# Patient Record
Sex: FEMALE | Race: WHITE | Employment: UNEMPLOYED | URBAN - NONMETROPOLITAN AREA
[De-identification: names, ages, dates, MRNs, and addresses within clinical notes are randomized per-mention and may not be internally consistent; named-entity substitution may affect disease eponyms.]

---

## 2021-11-18 ENCOUNTER — HOSPITAL ENCOUNTER (OUTPATIENT)
Dept: CT IMAGING | Age: 56
Discharge: HOME | End: 2021-11-18
Payer: MEDICARE

## 2021-11-18 ENCOUNTER — HOSPITAL ENCOUNTER (OUTPATIENT)
Dept: CT IMAGING | Age: 56
Discharge: HOME | End: 2021-11-18

## 2021-11-18 DIAGNOSIS — R41.0 SUBACUTE DELIRIUM: ICD-10-CM

## 2021-11-18 DIAGNOSIS — R41.0 DELIRIUM: ICD-10-CM

## 2021-11-18 PROCEDURE — 6360000004 HC RX CONTRAST MEDICATION: Performed by: NURSE PRACTITIONER

## 2021-11-18 PROCEDURE — 70470 CT HEAD/BRAIN W/O & W/DYE: CPT

## 2021-11-19 RX ADMIN — IOPAMIDOL 80 ML: 755 INJECTION, SOLUTION INTRAVENOUS at 12:39

## 2021-12-04 ENCOUNTER — HOSPITAL ENCOUNTER (EMERGENCY)
Age: 56
Discharge: HOME | End: 2021-12-04
Attending: EMERGENCY MEDICINE
Payer: MEDICARE

## 2021-12-04 VITALS
TEMPERATURE: 97.9 F | WEIGHT: 135 LBS | HEART RATE: 85 BPM | SYSTOLIC BLOOD PRESSURE: 107 MMHG | OXYGEN SATURATION: 100 % | DIASTOLIC BLOOD PRESSURE: 90 MMHG | RESPIRATION RATE: 19 BRPM

## 2021-12-04 DIAGNOSIS — F40.01 PANIC DISORDER WITH AGORAPHOBIA: Primary | ICD-10-CM

## 2021-12-04 DIAGNOSIS — T40.725A ADVERSE EFFECT OF SYNTHETIC CANNABINOID, INITIAL ENCOUNTER: ICD-10-CM

## 2021-12-04 DIAGNOSIS — Z77.29 CARBON MONOXIDE EXPOSURE: ICD-10-CM

## 2021-12-04 LAB
AMPHETAMINE+METHAMPHETAMINE URINE SCREEN: NEGATIVE
ANION GAP SERPL CALCULATED.3IONS-SCNC: 14 MEQ/L (ref 8–16)
BACTERIA: NORMAL
BARBITURATE QUANTITATIVE URINE: NEGATIVE
BASOPHILS # BLD: 0.2 %
BASOPHILS ABSOLUTE: 0 THOU/MM3 (ref 0–0.1)
BENZODIAZEPINE QUANTITATIVE URINE: NEGATIVE
BILIRUBIN URINE: NEGATIVE
BLOOD, URINE: NEGATIVE
BUN BLDV-MCNC: 11 MG/DL (ref 7–22)
CALCIUM SERPL-MCNC: 9.7 MG/DL (ref 8.5–10.5)
CANNABINOID QUANTITATIVE URINE: POSITIVE
CARBON MONOXIDE, BLOOD: 14.6 % CO SAT
CARBON MONOXIDE, BLOOD: 6 % CO SAT
CASTS: NORMAL /LPF
CASTS: NORMAL /LPF
CHARACTER, URINE: CLEAR
CHLORIDE BLD-SCNC: 105 MEQ/L (ref 98–111)
CO2: 26 MEQ/L (ref 23–33)
COCAINE METABOLITE QUANTITATIVE URINE: NEGATIVE
COLOR: YELLOW
CREAT SERPL-MCNC: 0.7 MG/DL (ref 0.4–1.2)
CRYSTALS: NORMAL
EOSINOPHIL # BLD: 0.1 %
EOSINOPHILS ABSOLUTE: 0 THOU/MM3 (ref 0–0.4)
EPITHELIAL CELLS, UA: NORMAL /HPF
ERYTHROCYTE [DISTWIDTH] IN BLOOD BY AUTOMATED COUNT: 13 % (ref 11.5–14.5)
ERYTHROCYTE [DISTWIDTH] IN BLOOD BY AUTOMATED COUNT: 46.8 FL (ref 35–45)
GFR SERPL CREATININE-BSD FRML MDRD: 87 ML/MIN/1.73M2
GLUCOSE BLD-MCNC: 166 MG/DL (ref 70–108)
GLUCOSE, URINE: NEGATIVE MG/DL
HCT VFR BLD CALC: 46.8 % (ref 37–47)
HEMOGLOBIN: 15.3 GM/DL (ref 12–16)
IMMATURE GRANS (ABS): 0.04 THOU/MM3 (ref 0–0.07)
IMMATURE GRANULOCYTES: 0.4 %
KETONES, URINE: NEGATIVE
LEUKOCYTE ESTERASE, URINE: NEGATIVE
LYMPHOCYTES # BLD: 9.2 %
LYMPHOCYTES ABSOLUTE: 0.8 THOU/MM3 (ref 1–4.8)
MCH RBC QN AUTO: 32 PG (ref 26–33)
MCHC RBC AUTO-ENTMCNC: 32.7 GM/DL (ref 32.2–35.5)
MCV RBC AUTO: 97.9 FL (ref 81–99)
MISCELLANEOUS LAB TEST RESULT: NORMAL
MONOCYTES # BLD: 1.8 %
MONOCYTES ABSOLUTE: 0.2 THOU/MM3 (ref 0.4–1.3)
NITRITE, URINE: NEGATIVE
NUCLEATED RED BLOOD CELLS: 0 /100 WBC
OPIATES, URINE: NEGATIVE
OSMOLALITY CALCULATION: 291.9 MOSMOL/KG (ref 275–300)
OXYCODONE: NEGATIVE
PH UA: 7 (ref 5–9)
PHENCYCLIDINE QUANTITATIVE URINE: NEGATIVE
PLATELET # BLD: 230 THOU/MM3 (ref 130–400)
PMV BLD AUTO: 10.9 FL (ref 9.4–12.4)
POTASSIUM SERPL-SCNC: 4.3 MEQ/L (ref 3.5–5.2)
PROTEIN UA: NEGATIVE MG/DL
RBC # BLD: 4.78 MILL/MM3 (ref 4.2–5.4)
RBC URINE: NORMAL /HPF
RENAL EPITHELIAL, UA: NORMAL
SEG NEUTROPHILS: 88.3 %
SEGMENTED NEUTROPHILS ABSOLUTE COUNT: 7.9 THOU/MM3 (ref 1.8–7.7)
SODIUM BLD-SCNC: 145 MEQ/L (ref 135–145)
SPECIFIC GRAVITY UA: 1.01 (ref 1–1.03)
UROBILINOGEN, URINE: 0.2 EU/DL (ref 0–1)
WBC # BLD: 9 THOU/MM3 (ref 4.8–10.8)
WBC UA: NORMAL /HPF
YEAST: NORMAL

## 2021-12-04 PROCEDURE — 80307 DRUG TEST PRSMV CHEM ANLYZR: CPT

## 2021-12-04 PROCEDURE — 81001 URINALYSIS AUTO W/SCOPE: CPT

## 2021-12-04 PROCEDURE — 85025 COMPLETE CBC W/AUTO DIFF WBC: CPT

## 2021-12-04 PROCEDURE — 80048 BASIC METABOLIC PNL TOTAL CA: CPT

## 2021-12-04 PROCEDURE — 82375 ASSAY CARBOXYHB QUANT: CPT

## 2021-12-04 PROCEDURE — 6370000000 HC RX 637 (ALT 250 FOR IP): Performed by: STUDENT IN AN ORGANIZED HEALTH CARE EDUCATION/TRAINING PROGRAM

## 2021-12-04 PROCEDURE — 99284 EMERGENCY DEPT VISIT MOD MDM: CPT

## 2021-12-04 PROCEDURE — 36415 COLL VENOUS BLD VENIPUNCTURE: CPT

## 2021-12-04 RX ORDER — LORAZEPAM 1 MG/1
1 TABLET ORAL ONCE
Status: COMPLETED | OUTPATIENT
Start: 2021-12-04 | End: 2021-12-04

## 2021-12-04 RX ADMIN — LORAZEPAM 1 MG: 1 TABLET ORAL at 12:22

## 2021-12-04 ASSESSMENT — ENCOUNTER SYMPTOMS
EYES NEGATIVE: 1
ALLERGIC/IMMUNOLOGIC NEGATIVE: 1
RESPIRATORY NEGATIVE: 1
GASTROINTESTINAL NEGATIVE: 1

## 2021-12-04 NOTE — ED PROVIDER NOTES
[address] [address]          Pt Name: Michael Amezcua  MRN: [id number]  Birthdate 1965  Date of evaluation: 12/4/2021  Treating Resident Physician: Africa Burgos MD  Supervising Physician: Dr. Kenji Mcdonough       Chief Complaint   Patient presents with    Altered Mental Status     History obtained from the patient. HISTORY OF PRESENT ILLNESS    HPI  Michael Amezcua is a 49 y.o. female who presents to the emergency department for evaluation of 2 months history of inability to participate in activities of daily living due to overwhelming sense of fear, sadness, nervousness, and paranoia. The patient's son was also in the examination room available for history. The patient states that she feels that she is having acute changes to her body however the son states that he has not noticed any changes in her body. The patient states that she was seen in [place] for similar complaints and that imaging was performed at that time which per the patient showed no acute abnormalities. Per the patient she is stopped taking some of her theatric medications like Seroquel over the last few weeks. She states that she has been unable to schedule appointment with psychiatrist due to availability. Patient denies any suicidal or homicidal ideation. Patient states that she does not have access to firearms at home. Patient denies auditory or visual hallucinations  The patient has no other acute complaints at this time. REVIEW OF SYSTEMS   Review of Systems   Constitutional: Positive for activity change. HENT: Negative. Eyes: Negative. Respiratory: Negative. Cardiovascular: Negative. Gastrointestinal: Negative. Endocrine: Negative. Genitourinary: Negative. Musculoskeletal: Negative. Skin: Negative. Allergic/Immunologic: Negative. Neurological: Negative for seizures, weakness, light-headedness and numbness. supple. Skin:     General: Skin is warm and dry. Capillary Refill: Capillary refill takes less than 2 seconds. Neurological:      Mental Status: She is alert. GCS: GCS eye subscore is 4. GCS verbal subscore is 5. GCS motor subscore is 6. Cranial Nerves: No cranial nerve deficit, dysarthria or facial asymmetry. Sensory: No sensory deficit. Motor: No weakness. Coordination: Coordination normal.   Psychiatric:         Attention and Perception: She does not perceive auditory or visual hallucinations. Mood and Affect: Mood is anxious and depressed. Speech: Speech is tangential.         Thought Content: Thought content is paranoid. Thought content does not include homicidal or suicidal ideation. Thought content does not include homicidal or suicidal plan. MEDICAL DECISION MAKING   Initial Assessment:   1. Panic disorder with agoraphobia  2. Carbon monoxide exposure  3. Adverse effects of cannabinoids. Plan:    No suicidal or homicidal ideation, no access to firearms in the home   Patient is safe from psychiatric standpoint   Elevated carboxyhemoglobin elicited on labs. These levels are inconsistent with smoking elation from chronic smoking though the patient does endorse being a smoker.  Oxyhemoglobin levels did decrease with oxygen supplementation with associated improvement to mentation.  Will instruct patient to contact fire department for home evaluation for carbon monoxide source   Return instructions provided. Also instructed to follow-up with behavioral health specialist via appointment made during her prior psychiatric inpatient admission at [facility].         ED RESULTS   Laboratory results:  Labs Reviewed   CBC WITH AUTO DIFFERENTIAL - Abnormal; Notable for the following components:       Result Value    RDW-SD 46.8 (*)     Segs Absolute 7.9 (*)     Lymphocytes Absolute 0.8 (*)     Monocytes Absolute 0.2 (*)     All other components within normal limits   BASIC METABOLIC PANEL - Abnormal; Notable for the following components:    Glucose 166 (*)     All other components within normal limits   GLOMERULAR FILTRATION RATE, ESTIMATED - Abnormal; Notable for the following components:    Est, Glom Filt Rate 87 (*)     All other components within normal limits   URINALYSIS WITH MICROSCOPIC   URINE DRUG SCREEN   CARBOXYHEMOGLOBIN   ANION GAP   OSMOLALITY   CARBOXYHEMOGLOBIN       Radiologic studies results:  No orders to display       ED Medications administered this visit:   Medications   LORazepam (ATIVAN) tablet 1 mg (1 mg Oral Given 21 1222)         ED COURSE     ED Course as of 21   Sat Dec 04, 2021   1138 [facility] AN AFFILIATE OF [facility] counselor notified of patient to provide outpatient psychiatry resources. [DT]   1228 Carbon Monoxide, Blood: 14.6 [DT]   1322 Outside facility records obtained showing inpatient admission to psychiatric unit from  and discharged on . It appears that the treating physician has switched her Seroquel to olanzapine during this admission. She was discharged with a follow-up appointment with behavioral health on  [JT]   1407 Carbon Monoxide, Blood: 6.0  Carboxyhemoglobin did decrease from 14 to 6 with oxygen supplementation via nonrebreather mask [JT]   1450 Patient underwent 2 hours of supplemental oxygen mwith nonrebreather mask. Upon reevaluation of the patient, she is appearing much more coherent and alert. Her family in the room with her are also stating that this is the most coherent she has been the last month and a half or so. [JT]      ED Course User Index  [DT] Emma Fernandez MD  [JT] Leward Apgar, MD       Strict return precautions and follow up instructions were discussed with the patient prior to discharge, with which the patient agrees. MEDICATION CHANGES     There are no discharge medications for this patient.         FINAL DISPOSITION     Final diagnoses:   Panic disorder with agoraphobia   Carbon monoxide exposure   Adverse effect of synthetic cannabinoid, initial encounter     Condition: condition: stable  Dispo: Discharge to home      This transcription was electronically signed. Parts of this transcriptions may have been dictated by use of voice recognition software and electronically transcribed, and parts may have been transcribed with the assistance of an ED scribe. The transcription may contain errors not detected in proofreading. Please refer to my supervising physician's documentation if my documentation differs.     Electronically Signed: Mirian Zelaya MD, 12/04/21, 7:48 PM          Mirian Zelaya MD  Resident  12/04/21 2389

## 2021-12-04 NOTE — ED PROVIDER NOTES
I have reviewed and interpreted all available lab, radiology and ekg results available at the moment. Diagnosis, treatment and disposition plans were discussed and agreed upon by patient. This transcription was electronically signed. It was dictated by use of voice recognition software and electronically transcribed. The transcription may contain errors not detected in proofreading.      I performed direct supervision and was present for the critical portion following procedures: None  Critical care time on this case: None    Electronically signed by Lucia Clarke MD on 21 at 12:29 PM EST       Lucia Clarke MD  [id number]

## 2021-12-04 NOTE — ED NOTES
Patient presents to the ED with son and daughter in law for concerns of continued confusion/dementia concerns on going for the last few months. Patient is alert and oriented, answering questions appropriately, however, seems confused. Patient states \"I am nervous and scared all the time. I am on disability and probably about to lose everything\" family at bedside state patient was admitted to a psych floor at [facility]. Patient states she was released on December 2nd. Son at bedside state they brought patient here due to not knowing what other resources or plan. Patient states she normally lives on her own, but has been living with a sister \"due to not being able to care for myself\". Patient also reports she has not been taking prescribed medications due to concerns of not being able to sleep and constipation. Exposed to rat poisoning about 2-3 months ago. Patient states she feels like symptoms started after exposure.         Luis Tatum RN  12/04/21 0183

## 2021-12-14 ENCOUNTER — HOSPITAL ENCOUNTER (INPATIENT)
Age: 56
LOS: 2 days | Discharge: PSYCHIATRIC OR SUBSTANCE USE FACILITY | DRG: 880 | End: 2021-12-16
Attending: PEDIATRICS | Admitting: PEDIATRICS
Payer: MEDICARE

## 2021-12-14 ENCOUNTER — APPOINTMENT (OUTPATIENT)
Dept: GENERAL RADIOLOGY | Age: 56
DRG: 880 | End: 2021-12-14
Payer: MEDICARE

## 2021-12-14 DIAGNOSIS — R41.82 ALTERED MENTAL STATUS, UNSPECIFIED ALTERED MENTAL STATUS TYPE: Primary | ICD-10-CM

## 2021-12-14 LAB
ACETAMINOPHEN LEVEL: < 5 UG/ML (ref 0–20)
ALBUMIN SERPL-MCNC: 4.8 G/DL (ref 3.5–5.1)
ALP BLD-CCNC: 105 U/L (ref 38–126)
ALT SERPL-CCNC: 25 U/L (ref 11–66)
AMPHETAMINE+METHAMPHETAMINE URINE SCREEN: NEGATIVE
ANION GAP SERPL CALCULATED.3IONS-SCNC: 13 MEQ/L (ref 8–16)
AST SERPL-CCNC: 21 U/L (ref 5–40)
BARBITURATE QUANTITATIVE URINE: NEGATIVE
BASOPHILS # BLD: 0.3 %
BASOPHILS ABSOLUTE: 0 THOU/MM3 (ref 0–0.1)
BENZODIAZEPINE QUANTITATIVE URINE: NEGATIVE
BILIRUB SERPL-MCNC: 0.6 MG/DL (ref 0.3–1.2)
BUN BLDV-MCNC: 9 MG/DL (ref 7–22)
CALCIUM SERPL-MCNC: 9.9 MG/DL (ref 8.5–10.5)
CANNABINOID QUANTITATIVE URINE: POSITIVE
CHLORIDE BLD-SCNC: 102 MEQ/L (ref 98–111)
CO2: 27 MEQ/L (ref 23–33)
COCAINE METABOLITE QUANTITATIVE URINE: NEGATIVE
CREAT SERPL-MCNC: 0.7 MG/DL (ref 0.4–1.2)
EOSINOPHIL # BLD: 0.5 %
EOSINOPHILS ABSOLUTE: 0 THOU/MM3 (ref 0–0.4)
ERYTHROCYTE [DISTWIDTH] IN BLOOD BY AUTOMATED COUNT: 12.8 % (ref 11.5–14.5)
ERYTHROCYTE [DISTWIDTH] IN BLOOD BY AUTOMATED COUNT: 44.3 FL (ref 35–45)
ETHYL ALCOHOL, SERUM: < 0.01 %
GFR SERPL CREATININE-BSD FRML MDRD: 87 ML/MIN/1.73M2
GLUCOSE BLD-MCNC: 133 MG/DL (ref 70–108)
HCT VFR BLD CALC: 43.3 % (ref 37–47)
HEMOGLOBIN: 14.8 GM/DL (ref 12–16)
IMMATURE GRANS (ABS): 0.02 THOU/MM3 (ref 0–0.07)
IMMATURE GRANULOCYTES: 0.2 %
LYMPHOCYTES # BLD: 28.8 %
LYMPHOCYTES ABSOLUTE: 2.6 THOU/MM3 (ref 1–4.8)
MCH RBC QN AUTO: 32.5 PG (ref 26–33)
MCHC RBC AUTO-ENTMCNC: 34.2 GM/DL (ref 32.2–35.5)
MCV RBC AUTO: 95 FL (ref 81–99)
MONOCYTES # BLD: 7.9 %
MONOCYTES ABSOLUTE: 0.7 THOU/MM3 (ref 0.4–1.3)
NUCLEATED RED BLOOD CELLS: 0 /100 WBC
OPIATES, URINE: NEGATIVE
OSMOLALITY CALCULATION: 283.7 MOSMOL/KG (ref 275–300)
OXYCODONE: NEGATIVE
PHENCYCLIDINE QUANTITATIVE URINE: NEGATIVE
PLATELET # BLD: 233 THOU/MM3 (ref 130–400)
PMV BLD AUTO: 10.7 FL (ref 9.4–12.4)
POTASSIUM SERPL-SCNC: 3.9 MEQ/L (ref 3.5–5.2)
RBC # BLD: 4.56 MILL/MM3 (ref 4.2–5.4)
SALICYLATE, SERUM: < 0.3 MG/DL (ref 2–10)
SARS-COV-2, NAAT: NOT  DETECTED
SEG NEUTROPHILS: 62.3 %
SEGMENTED NEUTROPHILS ABSOLUTE COUNT: 5.7 THOU/MM3 (ref 1.8–7.7)
SODIUM BLD-SCNC: 142 MEQ/L (ref 135–145)
TOTAL PROTEIN: 6.7 G/DL (ref 6.1–8)
WBC # BLD: 9.1 THOU/MM3 (ref 4.8–10.8)

## 2021-12-14 PROCEDURE — 80179 DRUG ASSAY SALICYLATE: CPT

## 2021-12-14 PROCEDURE — 96372 THER/PROPH/DIAG INJ SC/IM: CPT

## 2021-12-14 PROCEDURE — 82077 ASSAY SPEC XCP UR&BREATH IA: CPT

## 2021-12-14 PROCEDURE — 93005 ELECTROCARDIOGRAM TRACING: CPT | Performed by: PHYSICIAN ASSISTANT

## 2021-12-14 PROCEDURE — 87635 SARS-COV-2 COVID-19 AMP PRB: CPT

## 2021-12-14 PROCEDURE — 80143 DRUG ASSAY ACETAMINOPHEN: CPT

## 2021-12-14 PROCEDURE — 1200000000 HC SEMI PRIVATE

## 2021-12-14 PROCEDURE — G0378 HOSPITAL OBSERVATION PER HR: HCPCS

## 2021-12-14 PROCEDURE — 36415 COLL VENOUS BLD VENIPUNCTURE: CPT

## 2021-12-14 PROCEDURE — 99282 EMERGENCY DEPT VISIT SF MDM: CPT

## 2021-12-14 PROCEDURE — 6360000002 HC RX W HCPCS: Performed by: PHYSICIAN ASSISTANT

## 2021-12-14 PROCEDURE — 80053 COMPREHEN METABOLIC PANEL: CPT

## 2021-12-14 PROCEDURE — 71045 X-RAY EXAM CHEST 1 VIEW: CPT

## 2021-12-14 PROCEDURE — 99222 1ST HOSP IP/OBS MODERATE 55: CPT | Performed by: PHYSICIAN ASSISTANT

## 2021-12-14 PROCEDURE — 85025 COMPLETE CBC W/AUTO DIFF WBC: CPT

## 2021-12-14 PROCEDURE — 80307 DRUG TEST PRSMV CHEM ANLYZR: CPT

## 2021-12-14 RX ORDER — LORAZEPAM 2 MG/ML
1 INJECTION INTRAMUSCULAR ONCE
Status: COMPLETED | OUTPATIENT
Start: 2021-12-14 | End: 2021-12-14

## 2021-12-14 RX ORDER — DROPERIDOL 2.5 MG/ML
2.5 INJECTION, SOLUTION INTRAMUSCULAR; INTRAVENOUS ONCE
Status: COMPLETED | OUTPATIENT
Start: 2021-12-14 | End: 2021-12-14

## 2021-12-14 RX ADMIN — DROPERIDOL 2.5 MG: 2.5 INJECTION, SOLUTION INTRAMUSCULAR; INTRAVENOUS at 20:52

## 2021-12-14 RX ADMIN — LORAZEPAM 1 MG: 2 INJECTION INTRAMUSCULAR; INTRAVENOUS at 22:23

## 2021-12-14 ASSESSMENT — ENCOUNTER SYMPTOMS
COUGH: 0
SHORTNESS OF BREATH: 0
RHINORRHEA: 0
NAUSEA: 0
ABDOMINAL PAIN: 0
VOMITING: 0

## 2021-12-14 ASSESSMENT — SLEEP AND FATIGUE QUESTIONNAIRES
SLEEP PATTERN: DIFFICULTY FALLING ASLEEP;RESTLESSNESS;INSOMNIA;DISTURBED/INTERRUPTED SLEEP
DIFFICULTY STAYING ASLEEP: YES
DO YOU HAVE DIFFICULTY SLEEPING: YES
DIFFICULTY FALLING ASLEEP: YES
DIFFICULTY ARISING: NO
RESTFUL SLEEP: NO
DO YOU USE A SLEEP AID: NO

## 2021-12-15 ENCOUNTER — APPOINTMENT (OUTPATIENT)
Dept: MRI IMAGING | Age: 56
DRG: 880 | End: 2021-12-15
Payer: MEDICARE

## 2021-12-15 PROBLEM — J44.9 COPD (CHRONIC OBSTRUCTIVE PULMONARY DISEASE) (HCC): Status: ACTIVE | Noted: 2021-12-15

## 2021-12-15 LAB
ANION GAP SERPL CALCULATED.3IONS-SCNC: 12 MEQ/L (ref 8–16)
BASOPHILS # BLD: 0.5 %
BASOPHILS ABSOLUTE: 0 THOU/MM3 (ref 0–0.1)
BUN BLDV-MCNC: 8 MG/DL (ref 7–22)
CALCIUM SERPL-MCNC: 9.7 MG/DL (ref 8.5–10.5)
CHLORIDE BLD-SCNC: 103 MEQ/L (ref 98–111)
CO2: 27 MEQ/L (ref 23–33)
CREAT SERPL-MCNC: 0.7 MG/DL (ref 0.4–1.2)
EKG ATRIAL RATE: 102 BPM
EKG P AXIS: 84 DEGREES
EKG P-R INTERVAL: 114 MS
EKG Q-T INTERVAL: 354 MS
EKG QRS DURATION: 76 MS
EKG QTC CALCULATION (BAZETT): 461 MS
EKG R AXIS: 43 DEGREES
EKG T AXIS: 52 DEGREES
EKG VENTRICULAR RATE: 102 BPM
EOSINOPHIL # BLD: 0.9 %
EOSINOPHILS ABSOLUTE: 0.1 THOU/MM3 (ref 0–0.4)
ERYTHROCYTE [DISTWIDTH] IN BLOOD BY AUTOMATED COUNT: 12.9 % (ref 11.5–14.5)
ERYTHROCYTE [DISTWIDTH] IN BLOOD BY AUTOMATED COUNT: 46.5 FL (ref 35–45)
GFR SERPL CREATININE-BSD FRML MDRD: 87 ML/MIN/1.73M2
GLUCOSE BLD-MCNC: 85 MG/DL (ref 70–108)
HCT VFR BLD CALC: 43 % (ref 37–47)
HEMOGLOBIN: 14 GM/DL (ref 12–16)
HIV AG/AB: NONREACTIVE
IMMATURE GRANS (ABS): 0.01 THOU/MM3 (ref 0–0.07)
IMMATURE GRANULOCYTES: 0.2 %
LYMPHOCYTES # BLD: 38.4 %
LYMPHOCYTES ABSOLUTE: 2.5 THOU/MM3 (ref 1–4.8)
MCH RBC QN AUTO: 32.1 PG (ref 26–33)
MCHC RBC AUTO-ENTMCNC: 32.6 GM/DL (ref 32.2–35.5)
MCV RBC AUTO: 98.6 FL (ref 81–99)
MONOCYTES # BLD: 8.8 %
MONOCYTES ABSOLUTE: 0.6 THOU/MM3 (ref 0.4–1.3)
NUCLEATED RED BLOOD CELLS: 0 /100 WBC
OSMOLALITY CALCULATION: 280.7 MOSMOL/KG (ref 275–300)
PLATELET # BLD: 196 THOU/MM3 (ref 130–400)
PMV BLD AUTO: 10.7 FL (ref 9.4–12.4)
POTASSIUM REFLEX MAGNESIUM: 4.6 MEQ/L (ref 3.5–5.2)
RBC # BLD: 4.36 MILL/MM3 (ref 4.2–5.4)
SEG NEUTROPHILS: 51.2 %
SEGMENTED NEUTROPHILS ABSOLUTE COUNT: 3.3 THOU/MM3 (ref 1.8–7.7)
SODIUM BLD-SCNC: 142 MEQ/L (ref 135–145)
TSH SERPL DL<=0.05 MIU/L-ACNC: 2.67 UIU/ML (ref 0.4–4.2)
WBC # BLD: 6.5 THOU/MM3 (ref 4.8–10.8)

## 2021-12-15 PROCEDURE — G0378 HOSPITAL OBSERVATION PER HR: HCPCS

## 2021-12-15 PROCEDURE — 80048 BASIC METABOLIC PNL TOTAL CA: CPT

## 2021-12-15 PROCEDURE — 1200000000 HC SEMI PRIVATE

## 2021-12-15 PROCEDURE — 36415 COLL VENOUS BLD VENIPUNCTURE: CPT

## 2021-12-15 PROCEDURE — 87389 HIV-1 AG W/HIV-1&-2 AB AG IA: CPT

## 2021-12-15 PROCEDURE — 93010 ELECTROCARDIOGRAM REPORT: CPT | Performed by: INTERNAL MEDICINE

## 2021-12-15 PROCEDURE — 85025 COMPLETE CBC W/AUTO DIFF WBC: CPT

## 2021-12-15 PROCEDURE — 2580000003 HC RX 258: Performed by: PHYSICIAN ASSISTANT

## 2021-12-15 PROCEDURE — 90792 PSYCH DIAG EVAL W/MED SRVCS: CPT | Performed by: PSYCHIATRY & NEUROLOGY

## 2021-12-15 PROCEDURE — 6370000000 HC RX 637 (ALT 250 FOR IP)

## 2021-12-15 PROCEDURE — 70551 MRI BRAIN STEM W/O DYE: CPT

## 2021-12-15 PROCEDURE — 84443 ASSAY THYROID STIM HORMONE: CPT

## 2021-12-15 PROCEDURE — 6370000000 HC RX 637 (ALT 250 FOR IP): Performed by: PHYSICIAN ASSISTANT

## 2021-12-15 PROCEDURE — 86592 SYPHILIS TEST NON-TREP QUAL: CPT

## 2021-12-15 PROCEDURE — 99222 1ST HOSP IP/OBS MODERATE 55: CPT | Performed by: PHYSICIAN ASSISTANT

## 2021-12-15 PROCEDURE — 94640 AIRWAY INHALATION TREATMENT: CPT

## 2021-12-15 PROCEDURE — 99232 SBSQ HOSP IP/OBS MODERATE 35: CPT

## 2021-12-15 RX ORDER — ATORVASTATIN CALCIUM 40 MG/1
40 TABLET, FILM COATED ORAL DAILY
Status: DISCONTINUED | OUTPATIENT
Start: 2021-12-15 | End: 2021-12-16 | Stop reason: HOSPADM

## 2021-12-15 RX ORDER — BUSPIRONE HYDROCHLORIDE 10 MG/1
10 TABLET ORAL 2 TIMES DAILY
Status: DISCONTINUED | OUTPATIENT
Start: 2021-12-15 | End: 2021-12-16 | Stop reason: HOSPADM

## 2021-12-15 RX ORDER — ALBUTEROL SULFATE 90 UG/1
2 AEROSOL, METERED RESPIRATORY (INHALATION) EVERY 4 HOURS PRN
Status: DISCONTINUED | OUTPATIENT
Start: 2021-12-15 | End: 2021-12-16 | Stop reason: HOSPADM

## 2021-12-15 RX ORDER — BUDESONIDE AND FORMOTEROL FUMARATE DIHYDRATE 160; 4.5 UG/1; UG/1
2 AEROSOL RESPIRATORY (INHALATION) 2 TIMES DAILY
Status: DISCONTINUED | OUTPATIENT
Start: 2021-12-15 | End: 2021-12-16 | Stop reason: HOSPADM

## 2021-12-15 RX ORDER — ACETAMINOPHEN 650 MG/1
650 SUPPOSITORY RECTAL EVERY 6 HOURS PRN
Status: DISCONTINUED | OUTPATIENT
Start: 2021-12-15 | End: 2021-12-16 | Stop reason: HOSPADM

## 2021-12-15 RX ORDER — PANTOPRAZOLE SODIUM 40 MG/1
40 TABLET, DELAYED RELEASE ORAL
Status: DISCONTINUED | OUTPATIENT
Start: 2021-12-15 | End: 2021-12-16 | Stop reason: HOSPADM

## 2021-12-15 RX ORDER — SODIUM CHLORIDE 9 MG/ML
25 INJECTION, SOLUTION INTRAVENOUS PRN
Status: DISCONTINUED | OUTPATIENT
Start: 2021-12-15 | End: 2021-12-16 | Stop reason: HOSPADM

## 2021-12-15 RX ORDER — ONDANSETRON 4 MG/1
4 TABLET, ORALLY DISINTEGRATING ORAL EVERY 8 HOURS PRN
Status: DISCONTINUED | OUTPATIENT
Start: 2021-12-15 | End: 2021-12-16 | Stop reason: HOSPADM

## 2021-12-15 RX ORDER — SODIUM CHLORIDE 0.9 % (FLUSH) 0.9 %
5-40 SYRINGE (ML) INJECTION PRN
Status: DISCONTINUED | OUTPATIENT
Start: 2021-12-15 | End: 2021-12-16 | Stop reason: HOSPADM

## 2021-12-15 RX ORDER — POLYETHYLENE GLYCOL 3350 17 G/17G
17 POWDER, FOR SOLUTION ORAL DAILY PRN
Status: DISCONTINUED | OUTPATIENT
Start: 2021-12-15 | End: 2021-12-16 | Stop reason: HOSPADM

## 2021-12-15 RX ORDER — MAGNESIUM SULFATE IN WATER 40 MG/ML
2000 INJECTION, SOLUTION INTRAVENOUS PRN
Status: DISCONTINUED | OUTPATIENT
Start: 2021-12-15 | End: 2021-12-16 | Stop reason: HOSPADM

## 2021-12-15 RX ORDER — SODIUM CHLORIDE 0.9 % (FLUSH) 0.9 %
5-40 SYRINGE (ML) INJECTION EVERY 12 HOURS SCHEDULED
Status: DISCONTINUED | OUTPATIENT
Start: 2021-12-15 | End: 2021-12-16 | Stop reason: HOSPADM

## 2021-12-15 RX ORDER — NICOTINE 21 MG/24HR
1 PATCH, TRANSDERMAL 24 HOURS TRANSDERMAL DAILY
Status: DISCONTINUED | OUTPATIENT
Start: 2021-12-15 | End: 2021-12-16 | Stop reason: HOSPADM

## 2021-12-15 RX ORDER — BUDESONIDE AND FORMOTEROL FUMARATE DIHYDRATE 160; 4.5 UG/1; UG/1
2 AEROSOL RESPIRATORY (INHALATION) 2 TIMES DAILY
COMMUNITY

## 2021-12-15 RX ORDER — ALBUTEROL SULFATE 2.5 MG/3ML
2.5 SOLUTION RESPIRATORY (INHALATION) 4 TIMES DAILY PRN
COMMUNITY

## 2021-12-15 RX ORDER — POTASSIUM CHLORIDE 7.45 MG/ML
10 INJECTION INTRAVENOUS PRN
Status: DISCONTINUED | OUTPATIENT
Start: 2021-12-15 | End: 2021-12-16 | Stop reason: HOSPADM

## 2021-12-15 RX ORDER — ACETAMINOPHEN 325 MG/1
650 TABLET ORAL EVERY 6 HOURS PRN
Status: DISCONTINUED | OUTPATIENT
Start: 2021-12-15 | End: 2021-12-16 | Stop reason: HOSPADM

## 2021-12-15 RX ORDER — POTASSIUM CHLORIDE 20 MEQ/1
40 TABLET, EXTENDED RELEASE ORAL PRN
Status: DISCONTINUED | OUTPATIENT
Start: 2021-12-15 | End: 2021-12-16 | Stop reason: HOSPADM

## 2021-12-15 RX ORDER — LORAZEPAM 1 MG/1
1 TABLET ORAL EVERY 6 HOURS PRN
Status: DISCONTINUED | OUTPATIENT
Start: 2021-12-15 | End: 2021-12-16 | Stop reason: HOSPADM

## 2021-12-15 RX ORDER — ATORVASTATIN CALCIUM 40 MG/1
40 TABLET, FILM COATED ORAL DAILY
Status: ON HOLD | COMMUNITY
End: 2021-12-22 | Stop reason: SDUPTHER

## 2021-12-15 RX ORDER — ALBUTEROL SULFATE 90 UG/1
2 AEROSOL, METERED RESPIRATORY (INHALATION) EVERY 4 HOURS PRN
Status: ON HOLD | COMMUNITY
End: 2021-12-22 | Stop reason: SDUPTHER

## 2021-12-15 RX ORDER — ONDANSETRON 2 MG/ML
4 INJECTION INTRAMUSCULAR; INTRAVENOUS EVERY 6 HOURS PRN
Status: DISCONTINUED | OUTPATIENT
Start: 2021-12-15 | End: 2021-12-16 | Stop reason: HOSPADM

## 2021-12-15 RX ORDER — UMECLIDINIUM 62.5 UG/1
1 AEROSOL, POWDER ORAL DAILY
COMMUNITY

## 2021-12-15 RX ORDER — BUSPIRONE HYDROCHLORIDE 10 MG/1
10 TABLET ORAL 2 TIMES DAILY
Status: ON HOLD | COMMUNITY
End: 2021-12-22 | Stop reason: SDUPTHER

## 2021-12-15 RX ORDER — OMEPRAZOLE 20 MG/1
20 CAPSULE, DELAYED RELEASE ORAL DAILY
COMMUNITY

## 2021-12-15 RX ADMIN — BUDESONIDE AND FORMOTEROL FUMARATE DIHYDRATE 2 PUFF: 160; 4.5 AEROSOL RESPIRATORY (INHALATION) at 17:20

## 2021-12-15 RX ADMIN — SODIUM CHLORIDE, PRESERVATIVE FREE 10 ML: 5 INJECTION INTRAVENOUS at 12:27

## 2021-12-15 RX ADMIN — PANTOPRAZOLE SODIUM 40 MG: 40 TABLET, DELAYED RELEASE ORAL at 06:50

## 2021-12-15 RX ADMIN — ATORVASTATIN CALCIUM 40 MG: 40 TABLET, FILM COATED ORAL at 19:12

## 2021-12-15 RX ADMIN — BUSPIRONE HYDROCHLORIDE 10 MG: 10 TABLET ORAL at 19:12

## 2021-12-15 RX ADMIN — SODIUM CHLORIDE, PRESERVATIVE FREE 10 ML: 5 INJECTION INTRAVENOUS at 19:12

## 2021-12-15 RX ADMIN — LORAZEPAM 1 MG: 1 TABLET ORAL at 21:26

## 2021-12-15 RX ADMIN — BUSPIRONE HYDROCHLORIDE 10 MG: 10 TABLET ORAL at 12:25

## 2021-12-15 ASSESSMENT — ENCOUNTER SYMPTOMS
COUGH: 0
RHINORRHEA: 0
NAUSEA: 0
ABDOMINAL PAIN: 0
SHORTNESS OF BREATH: 0
GASTROINTESTINAL NEGATIVE: 1
VOMITING: 0
RESPIRATORY NEGATIVE: 1
SORE THROAT: 0
EYES NEGATIVE: 1
ALLERGIC/IMMUNOLOGIC NEGATIVE: 1

## 2021-12-15 NOTE — CONSULTS
Department of Psychiatry  Consult Service   Psychiatric Assessment        Thank you very much for allowing us to participate in the care of this patient.       Reason for Consult: Altered mental status     HISTORY OF PRESENT ILLNESS:           The patient is a 49 y.o. female with significant history of IVDU, tobacco and marijuana use, COPD, depression and anxiety who is admitted medically for altered mental status.     Per chart review, patient presented to the ED with her family and was referred to the ED by her PCP for further evaluation of ongoing issues over the past four months. Per the patient's family, the past several months patient has had a decline in her mental status and ability to function in daily life.  Family reports this started out after her neighbor's house had cockroaches. She put Borax around her house. [facility] moved away then the next neighbors had mice.  She then put Decon around her house.  Ms. Aisha Meade started to have white substance in her mouth and throat and was concerned that she was poisoned by the substances she placed around her house.  The patient had chest pain and palpitations. Family reported that this just was the start of the decline in her mental status.  The patient went to multiple ERs and her PCP for a multitude of medical complaints. Patient had a head CT at [facility]. Suzette's on 9709 that was normal and a visit here on 124 and was discharged.  2.5 weeks ago she was seen in [facility] and was admitted to a [place] coping center where it was considered putting the patient on dementia medicines.   Patient felt her psych medications were not working so her Celexa was changed to Seroquel.  She continued her [address] the symptoms worsened so she stopped both those medications.  Patient has been confused, cannot make decisions, and feels she cannot eat.  Ms. Aisha Meade has been afraid to sleep for fear that she is going to die. Kassandra Surjit is scared to be by herself. Aparna Ghosh reported that she was doesn't know. She says she was recently in the [facility] for 3 days and was not able to order her breakfast.  She says during that time, she felt like a \"looney tune\" and was hysterically crying but not producing tears. She states she is currently living with her sister because of this but her sister has not been able to care for either. Prior to August, she was living on her own and was adequately taking care of herself. She says she will be with other people but still feels very disconnected. She also has been feeling like the TV is overstimulating at times. She says she feels like she is impaired or on drugs. She also has been experiencing a lot of shakiness and restlessness. She says she has been experiencing real bad panic and fear. She has not been driving because she is afraid that she will appear impaired to law enforcement. She states she has been feeling paranoid that her phone is going to be hacked and her personal information will be leaked. She talks about how she has been poor all of her life but received a inheritance from her aunt upon her passing. She says she has also been paranoid that her family members are trying to kill her in order to get the money. She reports her appetite has been poor. She has not been sleeping well but reports she has not been utilizing her nightly oxygen at home.     Patient is restless and animated during the examination. She has rapid and pressured speech. She is able to hold a relevant conversation with this writer but does exhibit tangentiality and circumstantiality at times. She is fully oriented to person place situation and time during the interview. She recognizes that she has been experiencing paranoid but knows that they are delusions. She continue to endorse severe anxiety and reports Ativan has been helpful for her. She denies any suicidal or homicidal ideation.  Denies any hallucinations.         PSYCHIATRIC HISTORY:      · Outpatient psychiatric provider: PCP  · Suicide attempts: denies  · Inpatient psychiatric admissions: was recently admitted to the [facility] in November for 3 days     Past psychiatric medications includes:      Seroquel, Buspar and Celexa  Adverse reactions from psychotropic medications:     no        Lifetime Psychiatric Review of Systems      ·    Obsessions and Compulsions: Denies    ·    Whit or Hypomania: Denies  ·    Hallucinations: Denies  ·    Panic Attacks:  yes  ·    Delusions:  paranoid  ·    Phobias:  Denies  ·    Trauma: Denies     Home Medications           Prior to Admission medications    Medication Sig Start Date End Date Taking?  Authorizing Provider   busPIRone (BUSPAR) 10 MG tablet Take 10 mg by mouth 2 times daily Indications: Feeling Anxious     Yes Historical Provider, MD   atorvastatin (LIPITOR) 40 MG tablet Take 40 mg by mouth daily Indications: High Amount of Fats in the Blood     Yes Historical Provider, MD   omeprazole (PRILOSEC) 20 MG delayed release capsule Take 20 mg by mouth daily Indications: Heartburn     Yes Historical Provider, MD   albuterol sulfate HFA (VENTOLIN HFA) 108 (90 Base) MCG/ACT inhaler Inhale 2 puffs into the lungs every 4 hours as needed for Wheezing Indications: Chronic Obstructive Lung Disease     Yes Historical Provider, MD   budesonide-formoterol (SYMBICORT) 160-4.5 MCG/ACT AERO Inhale 2 puffs into the lungs 2 times daily Indications: Chronic Obstructive Lung Disease     Yes Historical Provider, MD   Umeclidinium Bromide (INCRUSE ELLIPTA) 62.5 MCG/INH AEPB Inhale 1 puff into the lungs daily Indications: Chronic Obstructive Lung Disease     Yes Historical Provider, MD   albuterol (PROVENTIL) (2.5 MG/3ML) 0.083% nebulizer solution Take 2.5 mg by nebulization 4 times daily as needed for Wheezing Indications: Chronic Obstructive Lung Disease     Yes Historical Provider, MD   OXYGEN Inhale 2 L into the lungs nightly     Yes Historical Provider, MD    Medications:      Current Hospital Medications   Current Facility-Administered Medications: sodium chloride flush 0.9 % injection 5-40 mL, 5-40 mL, IntraVENous, 2 times per day  sodium chloride flush 0.9 % injection 5-40 mL, 5-40 mL, IntraVENous, PRN  0.9 % sodium chloride infusion, 25 mL, IntraVENous, PRN  enoxaparin (LOVENOX) injection 40 mg, 40 mg, SubCUTAneous, Daily  ondansetron (ZOFRAN-ODT) disintegrating tablet 4 mg, 4 mg, Oral, Q8H PRN **OR** ondansetron (ZOFRAN) injection 4 mg, 4 mg, IntraVENous, Q6H PRN  polyethylene glycol (GLYCOLAX) packet 17 g, 17 g, Oral, Daily PRN  acetaminophen (TYLENOL) tablet 650 mg, 650 mg, Oral, Q6H PRN **OR** acetaminophen (TYLENOL) suppository 650 mg, 650 mg, Rectal, Q6H PRN  potassium chloride (KLOR-CON M) extended release tablet 40 mEq, 40 mEq, Oral, PRN **OR** potassium bicarb-citric acid (EFFER-K) effervescent tablet 40 mEq, 40 mEq, Oral, PRN **OR** potassium chloride 10 mEq/100 mL IVPB (Peripheral Line), 10 mEq, IntraVENous, PRN  magnesium sulfate 2000 mg in 50 mL IVPB premix, 2,000 mg, IntraVENous, PRN  albuterol sulfate  (90 Base) MCG/ACT inhaler 2 puff, 2 puff, Inhalation, Q4H PRN  atorvastatin (LIPITOR) tablet 40 mg, 40 mg, Oral, Daily  budesonide-formoterol (SYMBICORT) 160-4.5 MCG/ACT inhaler 2 puff, 2 puff, Inhalation, BID  busPIRone (BUSPAR) tablet 10 mg, 10 mg, Oral, BID  pantoprazole (PROTONIX) tablet 40 mg, 40 mg, Oral, QAM AC  tiotropium (SPIRIVA RESPIMAT) 2.5 MCG/ACT inhaler 2 puff, 2 puff, Inhalation, Daily  nicotine (NICODERM CQ) 14 MG/24HR 1 patch, 1 patch, TransDERmal, Daily         Past Medical History:    Past Medical History             Diagnosis Date    Anxiety      COPD (chronic obstructive pulmonary disease) (HCC)              Past Surgical History:    Past Surgical History   History reviewed.  No pertinent surgical history.        Allergies: Patient has no known allergies.       Social History:       RESIDENCE: Currently lives in [facility] [place] with her sister   :                  CHILDREN: has a son  OCCUPATION: On disability  EDUCATION: Graduated high school     SUBSTANCE USE HISTORY: Has a history of IV opiate and cocaine use when she was in her 31's. Smokes marijuana and cigarettes daily. Denies any alcohol use            Family Medical and Psychiatric History:      Denies any family psychiatric history      Family History   History reviewed. No pertinent family history.           Physical  BP (!) 163/72   Pulse 84   Temp 98.8 °F (37.1 °C) (Oral)   Resp 24   SpO2 98%      Constitutional: Well developed, well nourished, no acute distress  Eyes: Pupils round and reactive to light bilaterally  Neck:  Supple, no thyromegaly. Cardiovascular:  Normal rate and rhythm, normal S1 and S2. No murmur or gallop on auscultation. Radial pulses 2+ and brisk bilaterally  Lungs: Clear to auscultation bilaterally without wheezing or rales. Musculoskeletal:  Full range of motion in all four extremities. Neurologic:  Cranial nerves II through XII are grossly intact. Normal gait and station.        Mental Status Examination:  Level of consciousness:  Within normal limits  Appearance: hospital attire, lying in bed, fair grooming  Behavior/Motor:  Exaggerated, psychomotor agitation, animated   Attitude toward examiner:  cooperative, attentive and good eye contact  Speech:  Rapid and pressured   Mood:  Anxious   Affect: mood congruent  Thought processes:  Tangential, circumstantial  Thought content: denies suicidal ideations              denies homicidal ideations               denies hallucinations              Paranoid delusions;  Denies currently  Cognition:  Oriented to self, situation, location, date  Concentration clinically adequate  Memory age appropriate  Insight & Judgment:  fair     DSM-5 DIAGNOSIS:       Acute psychosis  Generalized anxiety disorder  Panic disorder without agoraphobia     General Medical Condition       Patient Active Problem List   Diagnosis Code    Altered mental status R41.82    COPD (chronic obstructive pulmonary disease) ([facility] [address] J44.9         Stressors     Severity of stressors is moderate  Source of stressors include: Other psychosocial and environmental stressors     RECOMMENDATIONS:    Continue Buspar as prescribed  Agree with neurology plan to check for any reversible causes of neurocognitive decline. Transfer to inpatient psychiatric unit once medically stable. Patient is agreeable to plan at this time. If patient is not agreeable to transfer, please place patient on an [facility]  Additional recommendations will follow the clinical course.      Electronically signed by Yanira Swenson MD on 12/15/21 at 6:06 PM EST

## 2021-12-15 NOTE — CONSULTS
Neurology Consult Note    Date:12/15/2021       FAMR:[id number]  Patient 4 Carlos Herron     YOB: 1965     Age:56 y.o. Requesting Physician: Kade Quick PA-C     Reason for Consult:  Evaluate for altered mental status      Chief Complaint:   Chief Complaint   Patient presents with    Psychiatric Evaluation       Subjective     Singh West is a 49 y.o. female with a history of anxiety and COPD who [facility] and management of altered mental status. The patient states for the past approximately 4 months she has had difficulty caring for herself and performing activities of daily living. She notes that her and with whom she was very close passed away approximately 4 months ago. Since then she states she has been very paranoid and has not wanted to go outside. She wakes up in the morning and feels anxious and has a pounding sensation in her chest and abdomen. This occurs throughout the day and the patient is unable to carry out her day-to-day activities. Prior to this, the patient states she was able to mow the lawn, go grocery shopping, and cook and clean. However, now she is not able to do any of these activities. She does note that she does feel depressed since her aunt passed away and states that her aunt was the only person who she was very close with. She does voice concern of being poisoned as there is rat poison around her house and at 1 point in the interview states that \"the mice might be poisoning her. \"She does admit to some visual hallucinations, but denies seeing any figures or people. She notes the hallucinations are fleeting and occur in the periphery. She denies any auditory hallucinations. She denies any suicidal or homicidal ideations, but states that if she does not get better she does not know what she would do in the future. She has no history of any other neurologic conditions. There is no history of stroke.   She denies any weakness, dizziness, headache, facial droop, speech difficulties, or vision changes. She does note numbness in her bilateral lower extremities that occurs with these episodes. She states she had an MRI performed approximately a month ago at an outside facility, but no report is able to be found in epic. She does use marijuana, but denies any other drug use. Her urine drug screen was positive for marijuana upon admission. Review of Systems   Review of Systems   Constitutional: Negative for chills and fever. HENT: Negative for rhinorrhea and sore throat. Eyes: Negative for visual disturbance. Respiratory: Negative for cough and shortness of breath. Cardiovascular: Positive for chest pain and palpitations. Gastrointestinal: Negative for abdominal pain, nausea and vomiting. Genitourinary: Negative for dysuria. Musculoskeletal: Negative for arthralgias and myalgias. Skin: Negative for rash. Neurological: Negative for dizziness, weakness, numbness and headaches. Psychiatric/Behavioral: Positive for behavioral problems and dysphoric mood (euphoric and elated). The patient is hyperactive. The patient is not nervous/anxious. Medications   Scheduled Meds:    sodium chloride flush  5-40 mL IntraVENous 2 times per day    enoxaparin  40 mg SubCUTAneous Daily    atorvastatin  40 mg Oral Daily    budesonide-formoterol  2 puff Inhalation BID    busPIRone  10 mg Oral BID    pantoprazole  40 mg Oral QAM AC    tiotropium  2 puff Inhalation Daily     Continuous Infusions:    sodium chloride       PRN Meds: sodium chloride flush, sodium chloride, ondansetron **OR** ondansetron, polyethylene glycol, acetaminophen **OR** acetaminophen, potassium chloride **OR** potassium alternative oral replacement **OR** potassium chloride, magnesium sulfate, albuterol sulfate HFA  Medications Prior to Admission:   No current facility-administered medications on file prior to encounter.      Current Outpatient Medications on File Prior to Encounter   Medication Sig Dispense Refill    busPIRone (BUSPAR) 10 MG tablet Take 10 mg by mouth 2 times daily Indications: Feeling Anxious      atorvastatin (LIPITOR) 40 MG tablet Take 40 mg by mouth daily Indications: High Amount of Fats in the Blood      omeprazole (PRILOSEC) 20 MG delayed release capsule Take 20 mg by mouth daily Indications: Heartburn      albuterol sulfate HFA (VENTOLIN HFA) 108 (90 Base) MCG/ACT inhaler Inhale 2 puffs into the lungs every 4 hours as needed for Wheezing Indications: Chronic Obstructive Lung Disease      budesonide-formoterol (SYMBICORT) 160-4.5 MCG/ACT AERO Inhale 2 puffs into the lungs 2 times daily Indications: Chronic Obstructive Lung Disease      Umeclidinium Bromide (INCRUSE ELLIPTA) 62.5 MCG/INH AEPB Inhale 1 puff into the lungs daily Indications: Chronic Obstructive Lung Disease      albuterol (PROVENTIL) (2.5 MG/3ML) 0.083% nebulizer solution Take 2.5 mg by nebulization 4 times daily as needed for Wheezing Indications: Chronic Obstructive Lung Disease      OXYGEN Inhale 2 L into the lungs nightly       Past History    Past Medical History:   has a past medical history of Anxiety and COPD (chronic obstructive pulmonary disease) ([facility] [address] Social History:        Family History: History reviewed. No pertinent family history. Physical Examination      Vitals:  /70   Pulse 80   Temp 99.4 °F (37.4 °C) (Oral)   Resp 18   SpO2 97%   Temp (24hrs), Av.7 °F (37.1 °C), Min:97.9 °F (36.6 °C), Max:99.4 °F (37.4 °C)      I/O (24Hr): No intake or output data in the 24 hours ending 12/15/21 0757      Physical Exam  Vitals reviewed. Constitutional:       General: She is not in acute distress. Appearance: Normal appearance. She is not ill-appearing. HENT:      Head: Normocephalic and atraumatic.       Right Ear: External ear normal.      Left Ear: External ear normal.      Nose: Nose normal.      Mouth/Throat: Mouth: Mucous membranes are moist.      Pharynx: No oropharyngeal exudate or posterior oropharyngeal erythema. Eyes:      Extraocular Movements: EOM normal.      Pupils: Pupils are equal, round, and reactive to light. Cardiovascular:      Rate and Rhythm: Normal rate and regular rhythm. Heart sounds: Normal heart sounds. No murmur heard. Pulmonary:      Effort: Pulmonary effort is normal. No respiratory distress. Breath sounds: Normal breath sounds. No wheezing. Abdominal:      General: Bowel sounds are normal.      Palpations: Abdomen is soft. Tenderness: There is no abdominal tenderness. Musculoskeletal:         General: Normal range of motion. Right lower leg: No edema. Left lower leg: No edema. Skin:     General: Skin is warm. Findings: No rash. Neurological:      Mental Status: She is alert and oriented to person, place, and time. Coordination: Finger-Nose-Finger Test and Heel to [facility] Test normal.      Deep Tendon Reflexes:      Reflex Scores:       Tricep reflexes are 2+ on the right side and 2+ on the left side. Bicep reflexes are 2+ on the right side and 2+ on the left side. Brachioradialis reflexes are 2+ on the right side and 2+ on the left side. Patellar reflexes are 2+ on the right side and 2+ on the left side. Achilles reflexes are 2+ on the right side and 2+ on the left side. Psychiatric:         Behavior: Behavior normal.      Comments: Elated and euphoric, patient standing and pacing around the room throughout the interview       Neurologic Exam     Mental Status   Oriented to person, place, and time. Attention: normal. Concentration: normal.   Speech: (Pressured speech)  Level of consciousness: alert  Normal comprehension. Tangential thought processes, racing thoughts     Cranial Nerves     CN II   Visual fields full to confrontation.    Right visual field deficit: none  Left visual field deficit: none     CN III, IV, VI Pupils are equal, round, and reactive to light. Extraocular motions are normal.   Right pupil: Shape: regular. Reactivity: brisk. Left pupil: Shape: regular. Reactivity: brisk. CN V   Facial sensation intact. Right facial sensation deficit: none  Left facial sensation deficit: none    CN VII   Facial expression full, symmetric. Right facial weakness: none  Left facial weakness: none    CN VIII   CN VIII normal.   Hearing: intact    CN IX, X   CN IX normal.   CN X normal.   Palate: symmetric    CN XI   CN XI normal.   Right trapezius strength: normal  Left trapezius strength: normal    CN XII   CN XII normal.   Tongue: not atrophic  Fasciculations: absent  Tongue deviation: none    Motor Exam   Muscle bulk: normal  Overall muscle tone: normal  Right arm tone: normal  Left arm tone: normal  Right arm pronator drift: absent  Left arm pronator drift: absent  Right leg tone: normal  Left leg tone: normal  Muscle strength 5/5 in bilateral upper and lower extremities     Sensory Exam   Light touch normal.     Gait, Coordination, and Reflexes     Coordination   Finger to nose coordination: normal  Heel to shin coordination: normal    Reflexes   Right brachioradialis: 2+  Left brachioradialis: 2+  Right biceps: 2+  Left biceps: 2+  Right triceps: 2+  Left triceps: 2+  Right patellar: 2+  Left patellar: 2+  Right achilles: 2+  Left achilles: 2+       Labs/Imaging/Diagnostics   Labs:  CBC:  Recent Labs     12/14/21  1950 12/15/21  0647   WBC 9.1 6.5   RBC 4.56 4.36   HGB 14.8 14.0   HCT 43.3 43.0   MCV 95.0 98.6    196     CHEMISTRIES:  Recent Labs     12/14/21  1950      K 3.9      CO2 27   BUN 9   CREATININE 0.7   GLUCOSE 133*     PT/INR:No results for input(s): PROTIME, INR in the last 72 hours. APTT:No results for input(s): APTT in the last 72 hours.   LIVER PROFILE:  Recent Labs     12/14/21  1950   AST 21   ALT 25   BILITOT 0.6   ALKPHOS 105     Imaging Last 24 Hours:  XR CHEST PORTABLE    Result Date: 12/14/2021  PROCEDURE: XR CHEST PORTABLE CLINICAL INFORMATION: sob, nausea, cough . TECHNIQUE: Portable upright COMPARISON: No prior study. FINDINGS: Patient is rotated. Inferior costophrenic angles are excluded from the image. Lungs are hyperinflated. Heart size normal. Mediastinum is not widened. Clothing artifacts are present. There is left medial basal infiltrate. Possible right apical scarring. Vessels are not congested. Calcified granulomas are noted. Left lower lobe infiltrates. Right apical scarring versus infiltrate. **This report has been created using voice recognition software. It may contain minor errors which are inherent in voice recognition technology. ** Final report electronically signed by Dr. Galen Georges on 12/14/2021 8:19 PM        Assessment and Plan:          1. Altered mental status  · CT reviewed with , appears there may be abnormality of right frontal lobe. Will obtain MRI without contrast of the brain. · B12, folate, VDRL, HIV pending  · Psychiatry planning to admit patient to psychiatric unit once medically cleared    This patient was seen and evaluated with Dr. Leslie Tabares and he is in agreement with the assessment and plan.     Electronically signed by Kristine Vaughan PA-C on 12/15/21 at 3:28 PM EST

## 2021-12-15 NOTE — ED NOTES
ED to inpatient nurses report    Chief Complaint   Patient presents with    Psychiatric Evaluation      Present to ED from home  LOC: alert and orientated to name, place, date  Vital signs   Vitals:    12/14/21 1906 12/14/21 2221 12/15/21 0051   BP: (!) 143/98 111/75 127/75   Pulse: 99 73 74   Resp: 18 16 16   Temp: 97.9 °F (36.6 °C)     TempSrc: Oral     SpO2: 97% 97% 99%      Oxygen Baseline RA    Current needs required RA   LDAs:   Peripheral IV 12/15/21 Left Hand (Active)   Site Assessment Clean; Dry; Intact 12/15/21 0218   Line Status Blood return noted; Infusing; Normal saline locked;  Flushed 12/15/21 0218   Dressing Status Clean; Dry; Intact 12/15/21 0218   Dressing Intervention New 12/15/21 0218     Mobility: Independent  Pending ED orders: none  Present condition: calm, cooperative     Electronically signed by Faith Ferraro RN on 12/15/2021 at 2:24 AM       Faith Ferraro [facility]  12/15/21 5960

## 2021-12-15 NOTE — PROGRESS NOTES
Pt admitted to  [phone] via in a wheelchair and via cart/stretcher from ED. Complaints: altered mental status. IV site free of s/s of infection or infiltration. Vital signs obtained. Assessment and data collection initiated. Two nurse skin assessment performed by So Mccarthy and Roxann Patel RN. Oriented to room. Policies and procedures for 5K explained. All questions answered with no further questions at this time. Fall prevention and safety brochure discussed with patient. Bed alarm on. Call light in reach. Oriented to room. Nikkie Elias, RN, RN 12/15/2021 6:07 AM     Explained patients right to have family, representative or physician notified of their admission. Patient has Declined for physician to be notified. Patient has Declined for family/representative to be notified.

## 2021-12-15 NOTE — H&P
Hospitalist - History & Physical      Patient: Erika Cabezas    Unit/Bed:5K-13/013-A  YOB: 1965  MRN: [id number]   Acct: [de-identified]   PCP: KEAGAN Soriano CNP      Assessment and Plan:        1. Altered mental status:   a. Inpatient psychiatry consult  b. Neurology consult  2. COPD:   a. Continue home inhalers  3. History of IVDU  a. 30 years ago - injected cocaine and opiates  b. Consider HIV   4. History of high risk sexual behavior  a. Consider HIV or syphillis   5. Weight loss  a. 15-20 pounds in 4 months  b. Add TSH with reflex  c. Possibly behavior    CC: Altered mental status    HPI: Patient presents to the ED with family. Patient was referred to the ED by her PCP for further evaluation of ongoing issues over the past four months. (Primary history is obtained from family and shared below due to its completeness.)  \"The past several months patient has had a decline in her mental status and ability to function in daily life. Family reports this started out after her neighbor's house had cockroaches. She put Borax around her house. They moved away then the next neighbors had mice. She then put Decon around her house. Ms. Cherie Heller started to have white substance in her mouth and throat and was concerned that she was poisoned by the substances she placed around her house. The patient had chest pain and palpitations. Family report that this just was the start of the decline in her mental status. The patient went to multiple ERs and her PCP for a multitude of medical complaints. Patient had a head CT here on 1119 that was normal and a visit here on 124 and was discharged. 2-1/2 weeks ago she was seen in [facility] and was admitted to a [place] coping center where it was considered putting the patient on dementia medicines. Patient felt her psych medications were not working so her Celexa was changed to Seroquel. She continued her BuSpar.   However the symptoms worsened so she stopped both those medications. Patient is confused, cannot make decisions, and feels she cannot eat. Ms. Kan Wright is afraid to sleep for fear that she is going to die. She is scared to be by herself. Son reports that she was never like this before. She is normally an upbeat person who cared for herself and lived on her own. Patient is now living with her sister and her sister can no longer take care of her. The patient cannot function on her own. The family took the patient to her PCP today who sent her to the ER for admission and psychiatric and medical evaluations. When asked about physical symptoms patient has difficulty answering questions. Patient is not suicidal or homicidal.  She denies nausea or vomiting. Patient smokes cigarettes and marijuana but denies alcohol or other illicit drug use. Patient is vaccinated against Covid. \" - copied from ED HPI    Patient later shares a history of IVDU and high risk sexual behavior 30 years ago. She was never treated for STDs but admits having many unprotected partners \"when I was a crackhead\". She was  4 times and is now . Patient also complains of a \"thick white coating\" inside of her mouth that comes and goes over the past 4 months. Patient does have a recent history of vulvar cancer with vulvectomy. ROS: Review of Systems   Constitutional: Positive for activity change, appetite change and fatigue. HENT: Negative. Eyes: Negative. Respiratory: Negative. Cardiovascular: Negative. Gastrointestinal: Negative. Endocrine: Negative. Genitourinary: Negative. Musculoskeletal: Negative. Skin: Negative. Allergic/Immunologic: Negative. Neurological: Negative. Hematological: Negative. Psychiatric/Behavioral: Positive for agitation, behavioral problems, confusion, decreased concentration and sleep disturbance. The patient is nervous/anxious.       PMH:    Past Medical History:   Diagnosis Date    Anxiety  COPD (chronic obstructive pulmonary disease) ([facility])      SHX:    Social History     Socioeconomic History    Marital status:      Spouse name: Not on file    Number of children: Not on file    Years of education: Not on file    Highest education level: Not on file   Occupational History    Not on file   Tobacco Use    Smoking status: Not on file    Smokeless tobacco: Not on file   Substance and Sexual Activity    Alcohol use: Not on file    Drug use: Not on file    Sexual activity: Not on file   Other Topics Concern    Not on file   Social History Narrative    Not on file     Social Determinants of Health     Financial Resource Strain:     Difficulty of Paying Living Expenses: Not on file   Food Insecurity:     Worried About Running Out of Food in the Last Year: Not on file    Dylan of Food in the Last Year: Not on file   Transportation Needs:     Lack of Transportation (Medical): Not on file    Lack of Transportation (Non-Medical): Not on file   Physical Activity:     Days of Exercise per Week: Not on file    Minutes of Exercise per Session: Not on file   Stress:     Feeling of Stress : Not on file   Social Connections:     Frequency of Communication with Friends and Family: Not on file    Frequency of Social Gatherings with Friends and Family: Not on file    Attends Catholic Services: Not on file    Active Member of [address] [place] [address] [employer] or Organizations: Not on file    Attends Club or Organization Meetings: Not on file    Marital Status: Not on file   Intimate Partner Violence:     Fear of Current or Ex-Partner: Not on file    Emotionally Abused: Not on file    Physically Abused: Not on file    Sexually Abused: Not on file   Housing Stability:     Unable to Pay for Housing in the Last Year: Not on file    Number of [place] in the Last Year: Not on file    Unstable Housing in the Last Year: Not on file     FHX: History reviewed. No pertinent family history.   Allergies: No Known Allergies  Medications:            Labs:   Recent Results (from the past 24 hour(s))   Acetaminophen level    Collection Time: 12/14/21  7:50 PM   Result Value Ref Range    Acetaminophen Level < 5.0 0.0 - 38.2 ug/mL   Salicylate Level    Collection Time: 12/14/21  7:50 PM   Result Value Ref Range    Salicylate, Serum < 0.3 (L) 2.0 - 10.0 mg/dL   Ethanol    Collection Time: 12/14/21  7:50 PM   Result Value Ref Range    ETHYL ALCOHOL, SERUM < 0.01 0.00 %   CBC Auto Differential    Collection Time: 12/14/21  7:50 PM   Result Value Ref Range    WBC 9.1 4.8 - 10.8 thou/mm3    RBC 4.56 4.20 - 5.40 mill/mm3    Hemoglobin 14.8 12.0 - 16.0 gm/dl    Hematocrit 43.3 37.0 - 47.0 %    MCV 95.0 81.0 - 99.0 fL    MCH 32.5 26.0 - 33.0 pg    MCHC 34.2 32.2 - 35.5 gm/dl    RDW-CV 12.8 11.5 - 14.5 %    RDW-SD 44.3 35.0 - 45.0 fL    Platelets 149 375 - 605 thou/mm3    MPV 10.7 9.4 - 12.4 fL    Seg Neutrophils 62.3 %    Lymphocytes 28.8 %    Monocytes 7.9 %    Eosinophils 0.5 %    Basophils 0.3 %    Immature Granulocytes 0.2 %    Segs Absolute 5.7 1.8 - 7.7 thou/mm3    Lymphocytes Absolute 2.6 1.0 - 4.8 thou/mm3    Monocytes Absolute 0.7 0.4 - 1.3 thou/mm3    Eosinophils Absolute 0.0 0.0 - 0.4 thou/mm3    Basophils Absolute 0.0 0.0 - 0.1 thou/mm3    Immature Grans (Abs) 0.02 0.00 - 0.07 thou/mm3    nRBC 0 /100 wbc   Comprehensive Metabolic Panel    Collection Time: 12/14/21  7:50 PM   Result Value Ref Range    Glucose 133 (H) 70 - 108 mg/dL    CREATININE 0.7 0.4 - 1.2 mg/dL    BUN 9 7 - 22 mg/dL    Sodium 142 135 - 145 meq/L    Potassium 3.9 3.5 - 5.2 meq/L    Chloride 102 98 - 111 meq/L    CO2 27 23 - 33 meq/L    Calcium 9.9 8.5 - 10.5 mg/dL    AST 21 5 - 40 U/L    Alkaline Phosphatase 105 38 - 126 U/L    Total Protein 6.7 6.1 - 8.0 g/dL    Albumin 4.8 3.5 - 5.1 g/dL    Total Bilirubin 0.6 0.3 - 1.2 mg/dL    ALT 25 11 - 66 U/L   Anion Gap    Collection Time: 12/14/21  7:50 PM   Result Value Ref Range    Anion Gap 13.0 8.0 - 16.0 meq/L Glomerular Filtration Rate, Estimated    Collection Time: 12/14/21  7:50 PM   Result Value Ref Range    Est, Glom Filt Rate 87 (A) ml/min/1.73m2   Osmolality    Collection Time: 12/14/21  7:50 PM   Result Value Ref Range    Osmolality Calc 283.7 275.0 - 300.0 mOsmol/kg   EKG Altered Mental Status    Collection Time: 12/14/21  8:51 PM   Result Value Ref Range    Ventricular Rate 102 BPM    Atrial Rate 102 BPM    P-R Interval 114 ms    QRS Duration 76 ms    Q-T Interval 354 ms    QTc Calculation (Bazett) 461 ms    P Axis 84 degrees    R Axis 43 degrees    T Axis 52 degrees   COVID-19, Rapid    Collection Time: 12/14/21  8:55 PM    Specimen: Nasopharyngeal Swab   Result Value Ref Range    SARS-CoV-2, NAAT NOT  DETECTED NOT DETECTED   Urine Drug Screen    Collection Time: 12/14/21  9:00 PM   Result Value Ref Range    AMPHETAMINE+METHAMPHETAMINE URINE SCREEN Negative NEGATIVE    Barbiturate Quant, Ur Negative NEGATIVE    Benzodiazepine Quant, Ur Negative NEGATIVE    Cannabinoid Quant, Ur POSITIVE NEGATIVE    Cocaine Metab Quant, Ur Negative NEGATIVE    Opiates, Urine Negative NEGATIVE    Oxycodone Negative NEGATIVE    PCP Quant, Ur Negative NEGATIVE         Vital Signs: T: 97.9F P: 99 RR: 18 B/P: 143/98: FiO2: RA: O2 Sat:97%: I/O: No intake or output data in the 24 hours ending 12/15/21 0544      General:   Well appearing, no acute distress  HEENT:  normocephalic and atraumatic. No scleral icterus. PEARLA, mucous membranes moist, exopthalmous  Neck: supple. Trachea midline. No JVD. Full ROM, no meningismus. Lungs: clear to auscultation. No retractions, no accessory muscle use. Cardiac: RRR, no murmur, 2+ pulses  Abdomen: soft. Nontender. Bowel sounds active  Extremities:  No clubbing, cyanosis x 4, no edema    Vasculature: capillary refill < 3 seconds. Skin:  warm and dry. no visible rashes  Psych:  Alert and oriented x3.   Affect exaggerated - very loud, pressured speech, very animated  Lymph:  No supraclavicular adenopathy. Neurologic:  CN II-XII grossly intact. No focal deficit. Data: (All radiographs, tracings, PFTs, and imaging are personally viewed and interpreted unless otherwise noted).     EKG: rhythm: sinus tachycardia, vwkk=150, uwwx=684 bpm, wz=779 ms, qrs=76 ms, br=887 ms, axis=84 degrees        Electronically signed by  Dominic Escobedo PA-C

## 2021-12-15 NOTE — ED TRIAGE NOTES
Pt presents to the ED with family. Pt states she is unsure why she is here. Family states that she was seen by her PCP today and they are concerned due to pt having anxiety and confusion. Pt denies suicidal and homicidal thoughts. Family states that pt is concerned she is not going to make it through the night.

## 2021-12-15 NOTE — ED NOTES
Pt is awake, resting on cot with family at bedside. Pt states she feels worse after droperidol.  Pt states she feels like she has \"restless leg syndrome all over\" her body      Margarita Greene RN  12/14/21 2575

## 2021-12-15 NOTE — FLOWSHEET NOTE
12/15/21 0437   Provider Notification   Reason for Communication Evaluate; Review case   Provider Name Charlie Figueroa   Provider Notification Physician   Method of Communication Secure Message   Response Waiting for response   Notification Time 5635     Consult made to psychiatry.

## 2021-12-15 NOTE — ED NOTES
Pt awake, resting on cot. RR even and unlabored. VSS. No distress noted. Pt placed on 2L NC, states she wears oxygen at night. Lights dimmed. Pt denies needs.      Gisela Mcdaniels RN  12/15/21 1430

## 2021-12-15 NOTE — FLOWSHEET NOTE
12/15/21 0435   Provider Notification   Reason for Communication Evaluate; Review case   Provider Name [facility]   Provider Notification Advance Practice Clinician (CNS/NP/CNM/CRNA/PA)   Method of Communication Secure Message   Response Waiting for response   Notification Time [phone]     Consult made to neurology.

## 2021-12-15 NOTE — CARE COORDINATION
**This is a psychiatric care coordination note. This is not to be used for billing purposes. **    Department of Psychiatry  Consult Service   Psychiatric Assessment      Thank you very much for allowing us to participate in the care of this patient. Reason for Consult: Altered mental status    HISTORY OF PRESENT ILLNESS:          The patient is a 49 y.o. female with significant history of IVDU, tobacco and marijuana use, COPD, depression and anxiety who is admitted medically for altered mental status. Per chart review, patient presented to the ED with her family and was referred to the ED by her PCP for further evaluation of ongoing issues over the past four months. Per the patient's family, the past several months patient has had a decline in her mental status and ability to function in daily life.  Family reports this started out after her neighbor's house had cockroaches. She put Borax around her house. Terisa Bhargavi moved away then the next neighbors had mice.  She then put Decon around her house.  Ms. Juanita Glass started to have white substance in her mouth and throat and was concerned that she was poisoned by the substances she placed around her house.  The patient had chest pain and palpitations. Family reported that this just was the start of the decline in her mental status.  The patient went to multiple ERs and her PCP for a multitude of medical complaints. Patient had a head CT at [facility] on 3746 that was normal and a visit here on 124 and was discharged.  2.5 weeks ago she was seen in [facility] and was admitted to a [place] coping center where it was considered putting the patient on dementia medicines.   Patient felt her psych medications were not working so her Celexa was changed to Seroquel.  She continued her [address] the symptoms worsened so she stopped both those medications.  Patient has been confused, cannot make decisions, and feels she cannot eat.  Ms. Juanita Glass has been afraid to sleep for fear that she is going to die. Sydnie Arias is scared to be by herself. Anamaria Zaragoza reported that she was never like this before. Sydnie Arias is normally an upbeat person who cared for herself and lived on her own. Kaelyn Robertson is now living with her sister and her sister can no longer take care of her.  The patient cannot function on her own. [facility] took the patient to her PCP who sent her to the ER for admission and psychiatric and medical evaluations. Dennis Guerra admission, when asked about physical symptoms, patient had difficulty answering questions.  Patient is not suicidal or homicidal.  She denies nausea or vomiting.  Patient smokes cigarettes and marijuana but denies alcohol or other illicit drug use.  Patient is vaccinated against Covid.    Patient later shared a history of IVDU and high risk sexual behavior 30 years ago. She was never treated for STDs but admits having many unprotected partners \"when I was a crackhead\". She was  4 times and is now .  Kaelyn Robertson also complained of a \"thick white coating\" inside of her mouth that has been coming and going over the past 4 months. Psychiatry was consulted due to altered mental status and the patient's inability to care for self    Eric How reports she has been experiencing an overwhelming sense of paranoia and disconnect lately. She says she has been feeling very forgetful and confused at times. She mentions her episodes of confusion are usually worse in the morning after she wakes up and diminish at night. She says this all started around August when she had mice in the home and messed with Decon at that time. Shortly after that, she developed a white film on her tongue, experienced dry mouth, increased mucus production, heart palpitations,dizziness and had red glassy eyes. She has also been experiencing abdominal pain, constipation and increased flatulence since then. She reports she has not been able to take care of herself lately.   She has not been able to make simple decisions such as what she wants to eat. She reports she always feels like she doesn't know. She says she was recently in the [facility] for 3 days and was not able to order her breakfast.  She says during that time, she felt like a \"looney tune\" and was hysterically crying but not producing tears. She states she is currently living with her sister because of this but her sister has not been able to care for either. Prior to August, she was living on her own and was adequately taking care of herself. She says she will be with other people but still feels very disconnected. She also has been feeling like the TV is overstimulating at times. She says she feels like she is impaired or on drugs. She also has been experiencing a lot of shakiness and restlessness. She says she has been experiencing real bad panic and fear. She has not been driving because she is afraid that she will appear impaired to law enforcement. She states she has been feeling paranoid that her phone is going to be hacked and her personal information will be leaked. She talks about how she has been poor all of her life but received a inheritance from her aunt upon her passing. She says she has also been paranoid that her family members are trying to kill her in order to get the money. She reports her appetite has been poor. She has not been sleeping well but reports she has not been utilizing her nightly oxygen at home. Patient is restless and animated during the examination. She has rapid and pressured speech. She is able to hold a relevant conversation with this writer but does exhibit tangentiality and circumstantiality at times. She is fully oriented to person place situation and time during the interview. She recognizes that she has been experiencing paranoid but knows that they are delusions. She continue to endorse severe anxiety and reports Ativan has been helpful for her.  She denies any suicidal or homicidal ideation. Denies any hallucinations. PSYCHIATRIC HISTORY:      · Outpatient psychiatric provider: PCP  · Suicide attempts: denies  · Inpatient psychiatric admissions: [facility]    Past psychiatric medications includes:     Seroquel, Buspar and Celexa  Adverse reactions from psychotropic medications:    no      Lifetime Psychiatric Review of Systems     ·    Obsessions and Compulsions: Denies    ·    Whit or Hypomania: Denies  ·    Hallucinations: Denies  ·    Panic Attacks:  yes  ·    Delusions:  paranoid  ·    Phobias:  Denies  ·    Trauma: Denies    Prior to Admission medications    Medication Sig Start Date End Date Taking?  Authorizing Provider   busPIRone (BUSPAR) 10 MG tablet Take 10 mg by mouth 2 times daily Indications: Feeling Anxious   Yes Historical Provider, MD   atorvastatin (LIPITOR) 40 MG tablet Take 40 mg by mouth daily Indications: High Amount of Fats in the Blood   Yes Historical Provider, MD   omeprazole (PRILOSEC) 20 MG delayed release capsule Take 20 mg by mouth daily Indications: Heartburn   Yes Historical Provider, MD   albuterol sulfate HFA (VENTOLIN HFA) 108 (90 Base) MCG/ACT inhaler Inhale 2 puffs into the lungs every 4 hours as needed for Wheezing Indications: Chronic Obstructive Lung Disease   Yes Historical Provider, MD   budesonide-formoterol (SYMBICORT) 160-4.5 MCG/ACT AERO Inhale 2 puffs into the lungs 2 times daily Indications: Chronic Obstructive Lung Disease   Yes Historical Provider, MD   Umeclidinium Bromide (INCRUSE ELLIPTA) 62.5 MCG/INH AEPB Inhale 1 puff into the lungs daily Indications: Chronic Obstructive Lung Disease   Yes Historical Provider, MD   albuterol (PROVENTIL) (2.5 MG/3ML) 0.083% nebulizer solution Take 2.5 mg by nebulization 4 times daily as needed for Wheezing Indications: Chronic Obstructive Lung Disease   Yes Historical Provider, MD   OXYGEN Inhale 2 L into the lungs nightly   Yes Historical Provider, MD        Medications:    Current Facility-Administered Medications: sodium chloride flush 0.9 % injection 5-40 mL, 5-40 mL, IntraVENous, 2 times per day  sodium chloride flush 0.9 % injection 5-40 mL, 5-40 mL, IntraVENous, PRN  0.9 % sodium chloride infusion, 25 mL, IntraVENous, PRN  enoxaparin (LOVENOX) injection 40 mg, 40 mg, SubCUTAneous, Daily  ondansetron (ZOFRAN-ODT) disintegrating tablet 4 mg, 4 mg, Oral, Q8H PRN **OR** ondansetron (ZOFRAN) injection 4 mg, 4 mg, IntraVENous, Q6H PRN  polyethylene glycol (GLYCOLAX) packet 17 g, 17 g, Oral, Daily PRN  acetaminophen (TYLENOL) tablet 650 mg, 650 mg, Oral, Q6H PRN **OR** acetaminophen (TYLENOL) suppository 650 mg, 650 mg, Rectal, Q6H PRN  potassium chloride (KLOR-CON M) extended release tablet 40 mEq, 40 mEq, Oral, PRN **OR** potassium bicarb-citric acid (EFFER-K) effervescent tablet 40 mEq, 40 mEq, Oral, PRN **OR** potassium chloride 10 mEq/100 mL IVPB (Peripheral Line), 10 mEq, IntraVENous, PRN  magnesium sulfate 2000 mg in 50 mL IVPB premix, 2,000 mg, IntraVENous, PRN  albuterol sulfate  (90 Base) MCG/ACT inhaler 2 puff, 2 puff, Inhalation, Q4H PRN  atorvastatin (LIPITOR) tablet 40 mg, 40 mg, Oral, Daily  budesonide-formoterol (SYMBICORT) 160-4.5 MCG/ACT inhaler 2 puff, 2 puff, Inhalation, BID  busPIRone (BUSPAR) tablet 10 mg, 10 mg, Oral, BID  pantoprazole (PROTONIX) tablet 40 mg, 40 mg, Oral, QAM AC  tiotropium (SPIRIVA RESPIMAT) 2.5 MCG/ACT inhaler 2 puff, 2 puff, Inhalation, Daily  nicotine (NICODERM CQ) 14 MG/24HR 1 patch, 1 patch, TransDERmal, Daily     Past Medical History:        Diagnosis Date    Anxiety     COPD (chronic obstructive pulmonary disease) (HCC)        Past Surgical History:    History reviewed. No pertinent surgical history. Allergies: Patient has no known allergies.       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 Patient is agreeable to plan at this time. If patient is not agreeable to transfer, please place patient on an [facility]  Additional recommendations will follow the clinical course.      Electronically signed by Heather Sepulveda PA-C on 12/15/21 at 11:25 AM EST

## 2021-12-15 NOTE — CARE COORDINATION
12/15/21, 7:54 AM EST  DISCHARGE PLANNING EVALUATION:    Chrissy Bravo       Admitted: 12/14/2021/ Dav day: 1   [facility]A Reason for admit: Altered mental status [R41.82]  Altered mental status, unspecified altered mental status type [R41.82]   PMH:  has a past medical history of Anxiety and COPD (chronic obstructive pulmonary disease) ([facility] [address] To ED has had a decline in her mental status and ability to function in daily life. Procedure: na  Barriers to Discharge:  Neuro and Psychiatry consulted,   PCP: KEAGAN Morocho CNP  Readmission Risk Score: 4.7 ( )%    Patient Goals/Plan/Treatment Preferences: Met with [facility]; she lives home alone. Plans to return there when med ready. Plans to go to [facility] for short stay. She has home oxygen through [facility] wear oxygen at 2L/min at HS only. She normally drives, care for self, and [facility] or in-home services. Transportation/Food Security/Housekeeping Addressed:  No issues identified.

## 2021-12-15 NOTE — PROGRESS NOTES
Hospitalist Progress Note      Patient:  Osbaldo Foley    Unit/Bed:5K-13/013-A  YOB: 1965  MRN: [id number]   Acct: [de-identified]   PCP: KEAGAN Gomez CNP  Date of Admission: 12/14/2021    Assessment/Plan:    1. Altered Mental Status:  · CT head on 11/09 without acute pathology. Neurology was consulted and obtained MRI without contrast of brain. Results pending. · Psychiatry was also consulted and would like pt to be admitted under their service once medically stable. Pending MRI results. · B12, folate, VDRL, and HIV pending   · Continue Buspar at this time. 2. COPD:  · Continue home medications  3. GERD:   · Continue Protonix  4. HLD:   · Continue statin     Chief Complaint: Altered mental status     Initial H and P:-    Patient presents to the ED with family. Patient was referred to the ED by her PCP for further evaluation of ongoing issues over the past four months. (Primary history is obtained from family and shared below due to its completeness.)  \"The past several months patient has had a decline in her mental status and ability to function in daily life.  Family reports this started out after her neighbor's house had cockroaches.  She put Borax around her house. Papi Doran moved away then the next neighbors had mice.  She then put Decon around her house.  Ms. Katie Marino started to have white substance in her mouth and throat and was concerned that she was poisoned by the substances she placed around her house.  The patient had chest pain and palpitations. Family report that this just was the start of the decline in her mental status.  The patient went to multiple ERs and her PCP for a multitude of medical complaints.  Patient had a head CT here on 1119 that was normal and a visit here on 124 and was discharged.  2-1/2 weeks ago she was seen in [facility] and was admitted to a [place] coping center where it was considered putting the patient on dementia medicines.   Patient felt her psych medications were not working so her Celexa was changed to Seroquel.  She continued her [address] the symptoms worsened so she stopped both those medications.  Patient is confused, cannot make decisions, and feels she cannot eat.  Ms. Katie Marino is afraid to sleep for fear that she is going to die. Gabby Donovan is scared to be by herself. Negra Hedrick reports that she was never like this before. Gabby Donovan is normally an upbeat person who cared for herself and lived on her own. Gianni Jimenez is now living with her sister and her sister can no longer take care of her.  The patient cannot function on her own. Jo Ann Dumas family took the patient to her PCP today who sent her to the ER for admission and psychiatric and medical evaluations.  When asked about physical symptoms patient has difficulty answering questions.  Patient is not suicidal or homicidal.  She denies nausea or vomiting.  Patient smokes cigarettes and marijuana but denies alcohol or other illicit drug use.  Patient is vaccinated against Covid. \" - copied from ED HPI    Subjective (past 24 hours):   Patient reports that she is feeling like herself today. She notes several times that she just feels like she isn't able to make decisions at home and due to the constant stressors at home it sends her into this \"mental state\" that she cannot get out of. Patient notes that she did not believe that her psych medications were working so she stopped taking them for a few days before presenting to the hospital. She states that there is something wrong with her and that she would like to be admitted to the psych unit for further evaluation as she is unable to do \"anything\" at home due to this problem. Past medical history, family history, social history and allergies reviewed again and is unchanged since admission. ROS (All review of systems completed. Pertinent positives noted.  Otherwise All other systems reviewed and negative.) Medications:  Reviewed    Infusion Medications    sodium chloride       Scheduled Medications    sodium chloride flush  5-40 mL IntraVENous 2 times per day    enoxaparin  40 mg SubCUTAneous Daily    atorvastatin  40 mg Oral Daily    budesonide-formoterol  2 puff Inhalation BID    busPIRone  10 mg Oral BID    pantoprazole  40 mg Oral QAM AC    tiotropium  2 puff Inhalation Daily    nicotine  1 patch TransDERmal Daily     PRN Meds: sodium chloride flush, sodium chloride, ondansetron **OR** ondansetron, polyethylene glycol, acetaminophen **OR** acetaminophen, potassium chloride **OR** potassium alternative oral replacement **OR** potassium chloride, magnesium sulfate, albuterol sulfate HFA      Intake/Output Summary (Last 24 hours) at 12/15/2021 1604  Last data filed at 12/15/2021 1330  Gross per 24 hour   Intake 960 ml   Output 0 ml   Net 960 ml       Diet:  ADULT DIET; Regular    Exam:  /72   Pulse 91   Temp 98.9 °F (37.2 °C) (Oral)   Resp 18   SpO2 96%   General appearance: Pt is hyperactive, no acute distress noted. HEENT: Pupils equal, round, and reactive to light. Conjunctivae/corneas clear. Neck: Supple, with full range of motion. No jugular venous distention. Trachea midline. Respiratory:  Normal respiratory effort. Clear to auscultation, bilaterally without Rales/Wheezes/Rhonchi. Cardiovascular: Regular rate and rhythm with normal S1/S2 without murmurs, rubs or gallops. Abdomen: Soft, non-tender, non-distended with normal bowel sounds. Musculoskeletal: passive and active ROM x 4 extremities. Skin: Skin color, texture, turgor normal.  No rashes or lesions. Neurologic:  Neurovascularly intact without any focal sensory/motor deficits. Cranial nerves: II-XII intact, grossly non-focal.  Psychiatric: Patient was pacing room while talking with me. She is Alert and oriented x4 but seems to have racing thoughts.    Capillary Refill: Brisk,< 3 seconds   Peripheral Pulses: +2 palpable, equal bilaterally     Labs:   Recent Labs     12/14/21  1950 12/15/21  0647   WBC 9.1 6.5   HGB 14.8 14.0   HCT 43.3 43.0    196     Recent Labs     12/14/21  1950 12/15/21  0647    142   K 3.9 4.6    103   CO2 27 27   BUN 9 8   CREATININE 0.7 0.7   CALCIUM 9.9 9.7     Recent Labs     12/14/21 1950   AST 21   ALT 25   BILITOT 0.6   ALKPHOS 105     No results for input(s): INR in the last 72 hours. No results for input(s): Iqra Dross in the last 72 hours. Microbiology:    Blood culture #1: No results found for: BC    Blood culture #2:No results found for: Jones Olivarez    Organism:No results found for: ORG    No results found for: LABGRAM    MRSA culture only:No results found for: [address] [place] [address]    Urine culture: No results found for: LABURIN    Respiratory culture: No results found for: CULTRESP    Aerobic and Anaerobic :  No results found for: LABAERO  No results found for: LABANAE    Urinalysis:      Lab Results   Component Value Date    NITRU NEGATIVE 12/04/2021    WBCUA 0-2 12/04/2021    BACTERIA NONE SEEN 12/04/2021    RBCUA 3-5 12/04/2021    BLOODU NEGATIVE 12/04/2021    SPECGRAV 1.008 12/04/2021       Radiology:  XR CHEST PORTABLE   Final Result   Left lower lobe infiltrates. Right apical scarring versus infiltrate. **This report has been created using voice recognition software. It may contain minor errors which are inherent in voice recognition technology. **      Final report electronically signed by Dr. Huy Carter on 12/14/2021 8:19 PM      MRI BRAIN WO CONTRAST    (Results Pending)     XR CHEST PORTABLE    Result Date: 12/14/2021  PROCEDURE: XR CHEST PORTABLE CLINICAL INFORMATION: sob, nausea, cough . TECHNIQUE: Portable upright COMPARISON: No prior study. FINDINGS: Patient is rotated. Inferior costophrenic angles are excluded from the image. Lungs are hyperinflated. Heart size normal. Mediastinum is not widened. Clothing artifacts are present.  There is left medial basal infiltrate. Possible right apical scarring. Vessels are not congested. Calcified granulomas are noted. Left lower lobe infiltrates. Right apical scarring versus infiltrate. **This report has been created using voice recognition software. It may contain minor errors which are inherent in voice recognition technology. ** Final report electronically signed by Dr. Delia Gonsales on 12/14/2021 8:19 PM      Electronically signed by Lesa Hurst PA-C on 12/15/2021 at 4:04 PM

## 2021-12-15 NOTE — ED NOTES
Pt sleeping on cot. RR even and unlabored. VSS. No distress noted. IV established. Pt denies needs.      John Centeno RN  12/15/21 [id number]

## 2021-12-15 NOTE — ED PROVIDER NOTES
[facility] EMERGENCY DEPT      CHIEF COMPLAINT       Chief Complaint   Patient presents with    Psychiatric Evaluation       Nurses Notes reviewed and I agree except as noted in the HPI. HISTORY OF PRESENT ILLNESS    Chrissy Bravo is a 49 y.o. female who presents for admission. The past several months patient has had a decline in her mental status and ability to function in daily life. Family reports this started out after her neighbor's house had cockroaches. She put Borax around her house. They moved away then the next neighbors had mice. She then put Decon around her house. Ms. Kareen Thibodeaux started to have white substance in her mouth and throat and was concerned that she was poisoned by the substances she placed around her house. The patient had chest pain and palpitations. Family report that this just was the start of the decline in her mental status. The patient went to multiple ERs and her PCP for a multitude of medical complaints. Patient had a head CT here on 119 that was normal and a visit here on 124 and was discharged. 2-1/2 weeks ago she was seen in [facility] and was admitted to a [place] coping center where it was considered putting the patient on dementia medicines. Patient felt her psych medications were not working so her Celexa was changed to Seroquel. She continued her BuSpar. However the symptoms worsened so she stopped both those medications. Patient is confused, cannot make decisions, and feels she cannot eat. Ms. Kareen Thibodeaux is afraid to sleep for fear that she is going to die. She is scared to be by herself. Son reports that she was never like this before. She is normally an upbeat person who cared for herself and lived on her own. Patient is now living with her sister and her sister can no longer take care of her. The patient cannot function on her own. The family took the patient to her PCP today who sent her to the ER for admission and psychiatric and medical evaluations. When asked about physical symptoms patient has difficulty answering questions. Patient is not suicidal or homicidal.  She denies nausea or vomiting. Patient smokes cigarettes and marijuana but denies alcohol or other illicit drug use. Patient is vaccinated against Covid. REVIEW OF SYSTEMS     Review of Systems   Constitutional: Positive for activity change and appetite change. Negative for fever. HENT: Negative for congestion and rhinorrhea. Eyes: Negative for visual disturbance. Respiratory: Negative for cough and shortness of breath. Cardiovascular: Negative for chest pain. Gastrointestinal: Negative for abdominal pain, nausea and vomiting. Genitourinary: Negative for decreased urine volume. Musculoskeletal: Negative for gait problem. Skin: Negative for rash and wound. Neurological: Negative for headaches. Psychiatric/Behavioral: Positive for sleep disturbance. Negative for hallucinations, self-injury and suicidal ideas. The patient is nervous/anxious and is hyperactive. PAST MEDICAL HISTORY    has no past medical history on file. SURGICAL HISTORY      has no past surgical history on file. CURRENT MEDICATIONS       Previous Medications    No medications on file       ALLERGIES     has No Known Allergies. FAMILY HISTORY     has no family status information on file. family history is not on file. SOCIAL HISTORY        PHYSICAL EXAM     INITIAL VITALS:  oral temperature is 97.9 °F (36.6 °C). Her blood pressure is 143/98 (abnormal) and her pulse is 99. Her respiration is 18 and oxygen saturation is 97%. Physical Exam  Vitals and nursing note reviewed. Constitutional:       General: She is not in acute distress. Appearance: She is well-developed and underweight. She is not toxic-appearing or diaphoretic. HENT:      Head: Normocephalic and atraumatic. Right Ear: Hearing normal.      Left Ear: Hearing normal.      Nose: Nose normal. No rhinorrhea. Mouth/Throat:      Pharynx: Uvula midline. No oropharyngeal exudate. Eyes:      General: Lids are normal. No scleral icterus. Conjunctiva/sclera: Conjunctivae normal.      Pupils: Pupils are equal, round, and reactive to light. Neck:      Trachea: No tracheal deviation. Cardiovascular:      Rate and Rhythm: Normal rate and regular rhythm. Heart sounds: Normal heart sounds. No murmur heard. Pulmonary:      Effort: Pulmonary effort is normal. No respiratory distress. Breath sounds: Normal breath sounds. No stridor. No decreased breath sounds or wheezing. Abdominal:      General: There is no distension. Palpations: Abdomen is soft. Abdomen is not rigid. Tenderness: There is no abdominal tenderness. Musculoskeletal:         General: Normal range of motion. Cervical back: Normal range of motion and neck supple. No rigidity. Comments: Movement normal as observed   Lymphadenopathy:      Cervical: No cervical adenopathy. Skin:     General: Skin is warm and dry. Coloration: Skin is not pale. Findings: No rash. Neurological:      Mental Status: She is alert and oriented to person, place, and time. Gait: Gait normal.      Comments: No gross deficits observed   Psychiatric:         Mood and Affect: Mood is anxious. Affect is inappropriate. Speech: Speech is delayed. Behavior: Behavior is hyperactive. Thought Content: Thought content is paranoid. Cognition and Memory: Cognition is impaired.          DIFFERENTIAL DIAGNOSIS:   Including but not limited to: Whit, mood disorder, encephalopathy, depression with psychosis, dementia, anxiety with panic    DIAGNOSTIC RESULTS     EKG: All EKG's are interpreted by the[facility] Phys[facility] who either signs or Co-signs this chart in the absence of a cardiologist.  Ventricular rate 102 bpm  NC interval 114 ms  QRS duration 76 ms  QTc interval 461 ms  P-R-T axes 84, 43, 52  Sinus tachycardia. No STEMI    RADIOLOGY: non-plain film images(s) such as CT,Ultrasound and MRI are read by the radiologist.  Plain radiographic images are visualized and preliminarily interpreted by the emergency physician unless otherwise stated below. XR CHEST PORTABLE   Final Result   Left lower lobe infiltrates. Right apical scarring versus infiltrate. **This report has been created using voice recognition software. It may contain minor errors which are inherent in voice recognition technology. **      Final report electronically signed by Dr. Marissa Aguiar on 12/14/2021 8:19 PM          LABS:   Labs Reviewed   SALICYLATE LEVEL - Abnormal; Notable for the following components:       Result Value    Salicylate, Serum < 0.3 (*)     All other components within normal limits   COMPREHENSIVE METABOLIC PANEL - Abnormal; Notable for the following components:    Glucose 133 (*)     All other components within normal limits   GLOMERULAR FILTRATION RATE, ESTIMATED - Abnormal; Notable for the following components:    Est, Glom Filt Rate 87 (*)     All other components within normal limits   COVID-19, RAPID   ACETAMINOPHEN LEVEL   ETHANOL   URINE DRUG SCREEN   CBC WITH AUTO DIFFERENTIAL   ANION GAP   OSMOLALITY       EMERGENCY DEPARTMENT COURSE:   Vitals:    Vitals:    12/14/21 1906   BP: (!) 143/98   Pulse: 99   Resp: 18   Temp: 97.9 °F (36.6 °C)   TempSrc: Oral   SpO2: 97%       Patient was seen in the emergency department during the global pandemic, when there was surge capacity and regional healthcare crisis. MDM:  The patient was seen in emergency room by me for altered mental status. Patient was normally happy, positive, and lived independently. Patient was now anxious, not eating, not sleeping, could not make decisions, and had poor thought processes. Old records were reviewed. Physical exam revealed an anxious 82-year-old female who had trouble answering simple questions.   She felt something was wrong but could not elaborate. Appropriate labs and imaging studies were ordered. The patient was closely observed and vital signs monitored. Labs were reviewed upon completion. Labs reflected no acute abnormalities. The results were discussed with the patient and family. The patient was thoroughly evaluated by Danny Alvarez from [facility], who consulted Dr. Amber Cabrera of psychiatry, who felt this was delirium and advised admission for neurology consult. This was discussed with the patient and her son. The patient was leery to be admitted but patient was not felt appropriate for discharge. While here the patient had been medicated with droperidol. There was improvement in her anxiety but patient had side effects of chest tightness and shortness of breath. Her pulse ox remained stable at 97% on room air with a heart rate of 87. Patient was then medicated with 1 mg of Ativan IM. Hospitalist Dr. Iwona Newman was consulted and graciously accepted admission. The patient was admitted to the hospital in fair condition. CRITICAL CARE:   None    CONSULTS:  [facility]  [facility])  Dr. Iwona Newman (hospitalist)    PROCEDURES:  None    FINAL IMPRESSION      1. Altered mental status, unspecified altered mental status type          DISPOSITION/PLAN     1.  Altered mental status, unspecified altered mental status type            (Please note that portions of this note were completed with a voice recognition program.  Efforts were made to edit the dictations but occasionally words are mis-transcribed.)    Alysa Diallo PA-C 12/14/21 10:17 PM    MANINDER Motley PA-C  12/14/21 [id number]

## 2021-12-16 ENCOUNTER — HOSPITAL ENCOUNTER (INPATIENT)
Age: 56
LOS: 6 days | Discharge: HOME | DRG: 880 | End: 2021-12-22
Attending: PSYCHIATRY & NEUROLOGY | Admitting: PSYCHIATRY & NEUROLOGY
Payer: MEDICARE

## 2021-12-16 VITALS
OXYGEN SATURATION: 97 % | TEMPERATURE: 97.8 F | RESPIRATION RATE: 16 BRPM | DIASTOLIC BLOOD PRESSURE: 72 MMHG | SYSTOLIC BLOOD PRESSURE: 117 MMHG | HEART RATE: 63 BPM

## 2021-12-16 DIAGNOSIS — F41.0 PANIC DISORDER: Primary | ICD-10-CM

## 2021-12-16 PROBLEM — F23 ACUTE PSYCHOSIS (HCC): Status: ACTIVE | Noted: 2021-12-16

## 2021-12-16 PROCEDURE — 6360000002 HC RX W HCPCS: Performed by: PHYSICIAN ASSISTANT

## 2021-12-16 PROCEDURE — 94640 AIRWAY INHALATION TREATMENT: CPT

## 2021-12-16 PROCEDURE — 6370000000 HC RX 637 (ALT 250 FOR IP)

## 2021-12-16 PROCEDURE — 1240000000 HC EMOTIONAL WELLNESS R&B

## 2021-12-16 PROCEDURE — G0378 HOSPITAL OBSERVATION PER HR: HCPCS

## 2021-12-16 PROCEDURE — 82607 VITAMIN B-12: CPT

## 2021-12-16 PROCEDURE — 99239 HOSP IP/OBS DSCHRG MGMT >30: CPT

## 2021-12-16 PROCEDURE — 6370000000 HC RX 637 (ALT 250 FOR IP): Performed by: PHYSICIAN ASSISTANT

## 2021-12-16 PROCEDURE — 82746 ASSAY OF FOLIC ACID SERUM: CPT

## 2021-12-16 PROCEDURE — 36415 COLL VENOUS BLD VENIPUNCTURE: CPT

## 2021-12-16 PROCEDURE — 96372 THER/PROPH/DIAG INJ SC/IM: CPT

## 2021-12-16 PROCEDURE — 2580000003 HC RX 258: Performed by: PHYSICIAN ASSISTANT

## 2021-12-16 RX ORDER — ALBUTEROL SULFATE 90 UG/1
2 AEROSOL, METERED RESPIRATORY (INHALATION) EVERY 4 HOURS PRN
Status: DISCONTINUED | OUTPATIENT
Start: 2021-12-16 | End: 2021-12-22 | Stop reason: HOSPADM

## 2021-12-16 RX ORDER — TRAZODONE HYDROCHLORIDE 50 MG/1
50 TABLET ORAL NIGHTLY PRN
Status: DISCONTINUED | OUTPATIENT
Start: 2021-12-16 | End: 2021-12-16

## 2021-12-16 RX ORDER — ACETAMINOPHEN 325 MG/1
650 TABLET ORAL EVERY 6 HOURS PRN
Status: DISCONTINUED | OUTPATIENT
Start: 2021-12-16 | End: 2021-12-16 | Stop reason: SDUPTHER

## 2021-12-16 RX ORDER — CLONAZEPAM 0.5 MG/1
0.5 TABLET ORAL 2 TIMES DAILY
Status: DISCONTINUED | OUTPATIENT
Start: 2021-12-16 | End: 2021-12-20

## 2021-12-16 RX ORDER — MAGNESIUM HYDROXIDE/ALUMINUM HYDROXICE/SIMETHICONE 120; 1200; 1200 MG/30ML; MG/30ML; MG/30ML
30 SUSPENSION ORAL EVERY 6 HOURS PRN
Status: DISCONTINUED | OUTPATIENT
Start: 2021-12-16 | End: 2021-12-16

## 2021-12-16 RX ORDER — ATORVASTATIN CALCIUM 40 MG/1
40 TABLET, FILM COATED ORAL DAILY
Status: DISCONTINUED | OUTPATIENT
Start: 2021-12-16 | End: 2021-12-22 | Stop reason: HOSPADM

## 2021-12-16 RX ORDER — IBUPROFEN 400 MG/1
400 TABLET ORAL EVERY 6 HOURS PRN
Status: DISCONTINUED | OUTPATIENT
Start: 2021-12-16 | End: 2021-12-16

## 2021-12-16 RX ORDER — POLYETHYLENE GLYCOL 3350 17 G/17G
17 POWDER, FOR SOLUTION ORAL DAILY PRN
Status: CANCELLED | OUTPATIENT
Start: 2021-12-16

## 2021-12-16 RX ORDER — PANTOPRAZOLE SODIUM 40 MG/1
40 TABLET, DELAYED RELEASE ORAL
Status: DISCONTINUED | OUTPATIENT
Start: 2021-12-17 | End: 2021-12-22 | Stop reason: HOSPADM

## 2021-12-16 RX ORDER — LORAZEPAM 2 MG/ML
2 INJECTION INTRAMUSCULAR EVERY 4 HOURS PRN
Status: DISCONTINUED | OUTPATIENT
Start: 2021-12-16 | End: 2021-12-16

## 2021-12-16 RX ORDER — NICOTINE 21 MG/24HR
1 PATCH, TRANSDERMAL 24 HOURS TRANSDERMAL DAILY
Status: CANCELLED | OUTPATIENT
Start: 2021-12-16

## 2021-12-16 RX ORDER — NICOTINE 21 MG/24HR
1 PATCH, TRANSDERMAL 24 HOURS TRANSDERMAL DAILY
Status: DISCONTINUED | OUTPATIENT
Start: 2021-12-16 | End: 2021-12-22 | Stop reason: HOSPADM

## 2021-12-16 RX ORDER — HALOPERIDOL 5 MG
5 TABLET ORAL EVERY 4 HOURS PRN
Status: DISCONTINUED | OUTPATIENT
Start: 2021-12-16 | End: 2021-12-16

## 2021-12-16 RX ORDER — ACETAMINOPHEN 650 MG/1
650 SUPPOSITORY RECTAL EVERY 6 HOURS PRN
Status: DISCONTINUED | OUTPATIENT
Start: 2021-12-16 | End: 2021-12-22 | Stop reason: HOSPADM

## 2021-12-16 RX ORDER — BUSPIRONE HYDROCHLORIDE 10 MG/1
10 TABLET ORAL 2 TIMES DAILY
Status: CANCELLED | OUTPATIENT
Start: 2021-12-16

## 2021-12-16 RX ORDER — BUSPIRONE HYDROCHLORIDE 10 MG/1
10 TABLET ORAL 2 TIMES DAILY
Status: DISCONTINUED | OUTPATIENT
Start: 2021-12-16 | End: 2021-12-22 | Stop reason: HOSPADM

## 2021-12-16 RX ORDER — POLYETHYLENE GLYCOL 3350 17 G/17G
17 POWDER, FOR SOLUTION ORAL DAILY PRN
Status: DISCONTINUED | OUTPATIENT
Start: 2021-12-16 | End: 2021-12-22 | Stop reason: HOSPADM

## 2021-12-16 RX ORDER — PANTOPRAZOLE SODIUM 40 MG/1
40 TABLET, DELAYED RELEASE ORAL
Status: CANCELLED | OUTPATIENT
Start: 2021-12-17

## 2021-12-16 RX ORDER — ACETAMINOPHEN 325 MG/1
650 TABLET ORAL EVERY 6 HOURS PRN
Status: CANCELLED | OUTPATIENT
Start: 2021-12-16

## 2021-12-16 RX ORDER — ALBUTEROL SULFATE 90 UG/1
2 AEROSOL, METERED RESPIRATORY (INHALATION) EVERY 4 HOURS PRN
Status: CANCELLED | OUTPATIENT
Start: 2021-12-16

## 2021-12-16 RX ORDER — ACETAMINOPHEN 325 MG/1
650 TABLET ORAL EVERY 4 HOURS PRN
Status: DISCONTINUED | OUTPATIENT
Start: 2021-12-16 | End: 2021-12-16

## 2021-12-16 RX ORDER — HYDROXYZINE HYDROCHLORIDE 25 MG/1
50 TABLET, FILM COATED ORAL 3 TIMES DAILY PRN
Status: DISCONTINUED | OUTPATIENT
Start: 2021-12-16 | End: 2021-12-16

## 2021-12-16 RX ORDER — ACETAMINOPHEN 650 MG/1
650 SUPPOSITORY RECTAL EVERY 6 HOURS PRN
Status: CANCELLED | OUTPATIENT
Start: 2021-12-16

## 2021-12-16 RX ORDER — LORAZEPAM 1 MG/1
1 TABLET ORAL EVERY 4 HOURS PRN
Status: DISCONTINUED | OUTPATIENT
Start: 2021-12-16 | End: 2021-12-16

## 2021-12-16 RX ORDER — ATORVASTATIN CALCIUM 40 MG/1
40 TABLET, FILM COATED ORAL DAILY
Status: CANCELLED | OUTPATIENT
Start: 2021-12-16

## 2021-12-16 RX ORDER — BUDESONIDE AND FORMOTEROL FUMARATE DIHYDRATE 160; 4.5 UG/1; UG/1
2 AEROSOL RESPIRATORY (INHALATION) 2 TIMES DAILY
Status: CANCELLED | OUTPATIENT
Start: 2021-12-16

## 2021-12-16 RX ORDER — HALOPERIDOL 5 MG/ML
5 INJECTION INTRAMUSCULAR EVERY 4 HOURS PRN
Status: DISCONTINUED | OUTPATIENT
Start: 2021-12-16 | End: 2021-12-16

## 2021-12-16 RX ORDER — BUDESONIDE AND FORMOTEROL FUMARATE DIHYDRATE 160; 4.5 UG/1; UG/1
2 AEROSOL RESPIRATORY (INHALATION) 2 TIMES DAILY
Status: DISCONTINUED | OUTPATIENT
Start: 2021-12-16 | End: 2021-12-22 | Stop reason: HOSPADM

## 2021-12-16 RX ADMIN — PANTOPRAZOLE SODIUM 40 MG: 40 TABLET, DELAYED RELEASE ORAL at 05:13

## 2021-12-16 RX ADMIN — ACETAMINOPHEN 650 MG: 325 TABLET ORAL at 20:20

## 2021-12-16 RX ADMIN — ATORVASTATIN CALCIUM 40 MG: 40 TABLET, FILM COATED ORAL at 20:19

## 2021-12-16 RX ADMIN — SODIUM CHLORIDE, PRESERVATIVE FREE 10 ML: 5 INJECTION INTRAVENOUS at 07:57

## 2021-12-16 RX ADMIN — ENOXAPARIN SODIUM 40 MG: 100 INJECTION SUBCUTANEOUS at 07:57

## 2021-12-16 RX ADMIN — Medication 2 PUFF: at 18:37

## 2021-12-16 RX ADMIN — LORAZEPAM 1 MG: 1 TABLET ORAL at 03:36

## 2021-12-16 RX ADMIN — TIOTROPIUM BROMIDE INHALATION SPRAY 2 PUFF: 3.12 SPRAY, METERED RESPIRATORY (INHALATION) at 07:38

## 2021-12-16 RX ADMIN — BUDESONIDE AND FORMOTEROL FUMARATE DIHYDRATE 2 PUFF: 160; 4.5 AEROSOL RESPIRATORY (INHALATION) at 07:38

## 2021-12-16 RX ADMIN — CLONAZEPAM 0.5 MG: 0.5 TABLET ORAL at 20:19

## 2021-12-16 RX ADMIN — BUSPIRONE HYDROCHLORIDE 10 MG: 10 TABLET ORAL at 20:19

## 2021-12-16 RX ADMIN — BUSPIRONE HYDROCHLORIDE 10 MG: 10 TABLET ORAL at 07:57

## 2021-12-16 RX ADMIN — LORAZEPAM 1 MG: 1 TABLET ORAL at 13:25

## 2021-12-16 ASSESSMENT — LIFESTYLE VARIABLES: HISTORY_ALCOHOL_USE: NO

## 2021-12-16 ASSESSMENT — PAIN SCALES - GENERAL
PAINLEVEL_OUTOF10: 0

## 2021-12-16 NOTE — PATIENT CARE CONFERENCE
[facility]  Initial Interdisciplinary Treatment Plan NOTE    Review Date & Time: 12/16/2021 1704    Patient was in treatment team.  See Multidisciplinary Treatment Team sheet for participants. Admission Type:   Admission Type: Voluntary    Reason for admission:  Reason for Admission: Somehow I lost my mind      Estimated Length of Stay Update:  3-5 days  Estimated Discharge Date Update: 3-5 days    PATIENT STRENGTHS:  Patient Strengths Strengths: Positive Support, Motivated  Patient Strengths and Limitations:Limitations: Tendency to isolate self, Hopeless about future  Addictive Behavior:Addictive Behavior  In the past 3 months, have you felt or has someone told you that you have a problem with:  : None  Do you have a history of Chemical Use?: No  Do you have a history of Alcohol Use?: No  Do you have a history of Street Drug Abuse?: Comment (MJ in tox screen)  Histroy of Prescripton Drug Abuse?: No  Medical Problems:  Past Medical History:   Diagnosis Date    Anxiety     COPD (chronic obstructive pulmonary disease) ([facility] [address]        EDUCATION:   Learner Progress Toward Treatment Goals: Reviewed results and recommendations of this team    Method: Individual    Outcome: Needs reinforcement    PATIENT GOALS: Pt unable to participate in treatment team due to current psychosocial functioning. PLAN/TREATMENT RECOMMENDATIONS UPDATE:   1. What is the most important thing we can help you with while you are here? Pt unable to participate in treatment team due to current psychosocial functioning. 2. Who is your support system? Pt unable to participate in treatment team due to current psychosocial functioning. 3. Do you have follow-up providers? Pt unable to participate in treatment team due to current psychosocial functioning. 4. Do you have the ability to pay for your medications? Pt unable to participate in treatment team due to current psychosocial functioning.    5. Where will you be residing when you leave the hospital?   Pt unable to participate in treatment team due to current psychosocial functioning. 6. Will need a return to work slip or FMLA paper completion? Pt unable to participate in treatment team due to current psychosocial functioning.        GOALS UPDATE:   Time frame for Short-Term Goals: Daily    ETHEL Lennon

## 2021-12-16 NOTE — PROGRESS NOTES
Patient changed into scrubs per Loyda Nova RN instructions. Patient discharged from [place] [address]. Patient into wheelchair, transported to  unit per [address], and .

## 2021-12-16 NOTE — DISCHARGE SUMMARY
Hospitalist Discharge Summary        Patient: Caden Maddox  YOB: 1965  MRN: [id number]   Acct: [de-identified]    Primary Care Physician: KEAGAN Cortés CNP    Admit date  12/14/2021    Discharge date: 12/16/2021  1:51 PM    Chief Complaint on presentation :-  Altered mental status     Discharge Assessment and Plan:-   1. Altered Mental Status:  ? Neurology consulted. CT head on 11/09 without acute pathology. ? MRI brain was normal. HIV panel non reactive. B12/folate and VDRL pending. Neurology signing off and agreeable with plan for discharge to psych as she is medically stable. ? Psychiatry was also consulted. Patient to be admitted under their service. She is medically cleared from our standpoint. ? Continue pt's Buspar. 2. COPD:  ? Continue home medications  3. GERD:   ? Continue home Protonix  4. HLD:   ? Continue home statin       Initial H and P and Hospital course:-  Patient presents to the ED with family. Terrence Chatman was referred to the ED by her PCP for further evaluation of ongoing issues over the past four months.  (Primary history is obtained from family and shared below due to its completeness.)  \"The past several months patient has had a decline in her mental status and ability to function in daily life.  Family reports this started out after her neighbor's house had cockroaches.  She put Borax around her house. Ana Whitaker moved away then the next neighbors had mice.  She then put Decon around her house.  Ms. Sandee Peralta started to have white substance in her mouth and throat and was concerned that she was poisoned by the substances she placed around her house.  The patient had chest pain and palpitations. Family report that this just was the start of the decline in her mental status.  The patient went to multiple ERs and her PCP for a multitude of medical complaints.  Patient had a head CT here on 1119 that was normal and a visit here on 124 and was discharged.  2-1/2 weeks ago she was seen in [facility] and was admitted to a [place] coping center where it was considered putting the patient on dementia medicines.   Patient felt her psych medications were not working so her Celexa was changed to Seroquel.  She continued her [address] the symptoms worsened so she stopped both those medications.  Patient is confused, cannot make decisions, and feels she cannot eat.  Ms. Vinnie Wells is afraid to sleep for fear that she is going to die. Juli Paz is scared to be by herself. Izabella Unger reports that she was never like this before. Juli Paz is normally an upbeat person who cared for herself and lived on her own. Bryon Whyte is now living with her sister and her sister can no longer take care of her.  The patient cannot function on her own. Barrie Taiflaco family took the patient to her PCP today who sent her to the ER for admission and psychiatric and medical evaluations.  When asked about physical symptoms patient has difficulty answering questions.  Patient is not suicidal or homicidal.  She denies nausea or vomiting.  Patient smokes cigarettes and marijuana but denies alcohol or other illicit drug use.  Patient is vaccinated against Covid. \" - copied from ED HPI      Hospital Course:   Patient was admitted to the hospital on 12/14 due to altered mental status. Patient has psych history and reported that she had not been taking her medications as prescribed and that she has not been able to care for herself at home. Pt was evaluated in the hospital in November for similar concerns. CT head was obtained on 11/09. This CT was reviewed by Dr. Adriana Gracia (neurology) and there was concern for abnormality of the right frontal lobe. MRI without contrast was obtained of brain. MRI normal. HIV panel non reactive. B12/folate and VDRL are pending. Psychiatry evaluated patient and requested that she be admitted under their service. Patient is medically cleared to be discharged to [facility]. She is agreeable with this plan.  No EMC required capsule  Commonly known as: PRILOSEC     OXYGEN     Symbicort 160-4.5 MCG/ACT Aero  Generic drug: budesonide-formoterol     * Ventolin  (90 Base) MCG/ACT inhaler  Generic drug: albuterol sulfate HFA     * albuterol (2.5 MG/3ML) 0.083% nebulizer solution  Commonly known as: PROVENTIL         * This list has 2 medication(s) that are the same as other medications prescribed for you. Read the directions carefully, and ask your doctor or other care provider to review them with you.                  Labs :-  Recent Results (from the past 72 hour(s))   Acetaminophen level    Collection Time: 12/14/21  7:50 PM   Result Value Ref Range    Acetaminophen Level < 5.0 0.0 - 90.2 ug/mL   Salicylate Level    Collection Time: 12/14/21  7:50 PM   Result Value Ref Range    Salicylate, Serum < 0.3 (L) 2.0 - 10.0 mg/dL   Ethanol    Collection Time: 12/14/21  7:50 PM   Result Value Ref Range    ETHYL ALCOHOL, SERUM < 0.01 0.00 %   CBC Auto Differential    Collection Time: 12/14/21  7:50 PM   Result Value Ref Range    WBC 9.1 4.8 - 10.8 thou/mm3    RBC 4.56 4.20 - 5.40 mill/mm3    Hemoglobin 14.8 12.0 - 16.0 gm/dl    Hematocrit 43.3 37.0 - 47.0 %    MCV 95.0 81.0 - 99.0 fL    MCH 32.5 26.0 - 33.0 pg    MCHC 34.2 32.2 - 35.5 gm/dl    RDW-CV 12.8 11.5 - 14.5 %    RDW-SD 44.3 35.0 - 45.0 fL    Platelets 736 610 - 231 thou/mm3    MPV 10.7 9.4 - 12.4 fL    Seg Neutrophils 62.3 %    Lymphocytes 28.8 %    Monocytes 7.9 %    Eosinophils 0.5 %    Basophils 0.3 %    Immature Granulocytes 0.2 %    Segs Absolute 5.7 1.8 - 7.7 thou/mm3    Lymphocytes Absolute 2.6 1.0 - 4.8 thou/mm3    Monocytes Absolute 0.7 0.4 - 1.3 thou/mm3    Eosinophils Absolute 0.0 0.0 - 0.4 thou/mm3    Basophils Absolute 0.0 0.0 - 0.1 thou/mm3    Immature Grans (Abs) 0.02 0.00 - 0.07 thou/mm3    nRBC 0 /100 wbc   Comprehensive Metabolic Panel    Collection Time: 12/14/21  7:50 PM   Result Value Ref Range    Glucose 133 (H) 70 - 108 mg/dL    CREATININE 0.7 0.4 - 1.2 mg/dL 1.2 mg/dL    Calcium 9.7 8.5 - 10.5 mg/dL   CBC auto differential    Collection Time: 12/15/21  6:47 AM   Result Value Ref Range    WBC 6.5 4.8 - 10.8 thou/mm3    RBC 4.36 4.20 - 5.40 mill/mm3    Hemoglobin 14.0 12.0 - 16.0 gm/dl    Hematocrit 43.0 37.0 - 47.0 %    MCV 98.6 81.0 - 99.0 fL    MCH 32.1 26.0 - 33.0 pg    MCHC 32.6 32.2 - 35.5 gm/dl    RDW-CV 12.9 11.5 - 14.5 %    RDW-SD 46.5 (H) 35.0 - 45.0 fL    Platelets 317 353 - 173 thou/mm3    MPV 10.7 9.4 - 12.4 fL    Seg Neutrophils 51.2 %    Lymphocytes 38.4 %    Monocytes 8.8 %    Eosinophils 0.9 %    Basophils 0.5 %    Immature Granulocytes 0.2 %    Segs Absolute 3.3 1.8 - 7.7 thou/mm3    Lymphocytes Absolute 2.5 1.0 - 4.8 thou/mm3    Monocytes Absolute 0.6 0.4 - 1.3 thou/mm3    Eosinophils Absolute 0.1 0.0 - 0.4 thou/mm3    Basophils Absolute 0.0 0.0 - 0.1 thou/mm3    Immature Grans (Abs) 0.01 0.00 - 0.07 thou/mm3    nRBC 0 /100 wbc   Anion Gap    Collection Time: 12/15/21  6:47 AM   Result Value Ref Range    Anion Gap 12.0 8.0 - 16.0 meq/L   Glomerular Filtration Rate, Estimated    Collection Time: 12/15/21  6:47 AM   Result Value Ref Range    Est, Glom Filt Rate 87 (A) ml/min/1.73m2   Osmolality    Collection Time: 12/15/21  6:47 AM   Result Value Ref Range    Osmolality Calc 280.7 275.0 - 300.0 mOsmol/kg   TSH with Reflex    Collection Time: 12/15/21  9:15 AM   Result Value Ref Range    TSH 2.670 0.400 - 4.200 uIU/mL   HIV Screen    Collection Time: 12/15/21  9:15 AM   Result Value Ref Range    HIV Ag/Ab NONREACTIVE NR        Microbiology:    Blood culture #1: No results found for: BC    Blood culture #2:No results found for: BLOODCULT2    Organism:  No results found for: LABGRAM    MRSA culture only:No results found for: [facility]    Urine culture: No results found for: LABURIN  No results found for: ORG     Respiratory culture: No results found for: CULTRESP    Aerobic and Anaerobic :  No results found for: LABAERO  No results found for: LABANAE    Urinalysis:      Lab Results   Component Value Date    NITRU NEGATIVE 12/04/2021    WBCUA 0-2 12/04/2021    BACTERIA NONE SEEN 12/04/2021    RBCUA 3-5 12/04/2021    BLOODU NEGATIVE 12/04/2021    SPECGRAV 1.008 12/04/2021       Radiology:-  XR CHEST PORTABLE    Result Date: 12/14/2021  PROCEDURE: XR CHEST PORTABLE CLINICAL INFORMATION: sob, nausea, cough . TECHNIQUE: Portable upright COMPARISON: No prior study. FINDINGS: Patient is rotated. Inferior costophrenic angles are excluded from the image. Lungs are hyperinflated. Heart size normal. Mediastinum is not widened. Clothing artifacts are present. There is left medial basal infiltrate. Possible right apical scarring. Vessels are not congested. Calcified granulomas are noted. Left lower lobe infiltrates. Right apical scarring versus infiltrate. **This report has been created using voice recognition software. It may contain minor errors which are inherent in voice recognition technology. ** Final report electronically signed by Dr. Laurence Mujica on 12/14/2021 8:19 PM    MRI BRAIN WO CONTRAST    Result Date: 12/15/2021  WET READ: MRI of the brain without contrast There is no acute process identified. Is no evidence of acute infarction. No hemorrhage identified. Ventricles are normal. No extra-axial collection is seen. Probable mucous retention cyst in the left sphenoid sinus This examination will be formally read by one of the neuroradiologist tomorrow. Please see that report. FINAL REPORT: PROCEDURE: MRI BRAIN WO CONTRAST INDICATION:  altered mental status. COMPARISON: CT head dated 11/18/2021. TECHNIQUE: Multiplanar and multiple spin echo MRI images were obtained of the brain without contrast. FINDINGS: The ventricles, cisterns and sulci are symmetric and normal in size and configuration. No significant focal areas of abnormal T2/flair prolongation are identified within the parenchyma. No intra or extra-axial mass is identified.  No focal areas of restricted diffusion are present. The major vascular flow voids appear patent. Orbits are unremarkable. There is a small mucous retention cyst in the left sphenoid sinus. Paranasal sinuses and mastoid air cells are otherwise clear. Normal MRI of the brain. **This report has been created using voice recognition software. It may contain minor errors which are inherent in voice recognition technology. ** Final report electronically signed by Dr. Mihir Doyle MD on 12/15/2021 5:26 PM       Follow-up scheduled after discharge :-    in 1 week with KEAGAN Adkins - CNP    Consultations during this hospital stay:-  [] NONE [] Cardiology  [] Nephrology  [] Hemo onco   [] GI   [] ID  [] Endocrine  [] Pulm    [x] Neuro    [x] Psych   [] Urology  [] ENT   [] G SURGERY   []Ortho    []CV surg    [] Palliative  [] Hospice [] Pain management   []    []TCU   [] PT/OT  OTHERS:-    Disposition: Inpatient psychiatry   Condition at Discharge: Stable    Time Spent:- 55 minutes    Electronically signed by Jae Carmen PA-C on 12/16/21 at 11:46 AM EST   Discharging Hospitalist

## 2021-12-16 NOTE — PROGRESS NOTES
Patient stated feeling confused and somewhat disconnected with her speech stating \"I have my clothes together but am I supposed to put dirty clothes on when I leave? \". Lion Wiley for anxiety regarding move to 4E. Report given to [facility] on 4E. Explained patient was Alert and  Oriented x4 on assessment this morning, but seems confused currently, giving Ativan. Gave patient discharge instructions with patient verbalized understanding. Patient condition stable for transport to 4E. Notified security for escort.

## 2021-12-16 NOTE — BH NOTE
INPATIENT RECREATIONAL THERAPY  ADULT BEHAVIORAL SERVICES  EVALUATION    REFERRING PHYSICIAN:   Dr. Geovanna Vitale  DIAGNOSIS:    Acute Psychosis  PRECAUTIONS:    Standard precautions    HISTORY OF PRESENT ILLNESS/INJURY:   Patient was admitted to the unit due to psychosis and confusion. Patient reported that she has some visual hallucinations at times. Patient also reported some paranoia, anxiety and delusional thoughts. Patient stated that she does not really know why she has been admitted to this unit. PMH:  Please see medical chart for prior medical history, allergies, and medication    HISTORY OF PSYCHIATRIC TREATMENT:  Patient reported a history of psych treatment but was unable to identify when she was treated. DATE OF BIRTH:   [birth year]  GENDER:   female  MARITAL STATUS:   Patient has one son. EMPLOYMENT STATUS:    Patient is on disability. LIVING SITUATION/SUPPORT:  Patient lives with a friend but is currently staying with her sister. EDUCATIONAL LEVEL:    Patient has 3 years of college. MEDICATION/DRUG USE:  Marijuana use. LEISURE INTERESTS:  spiritual activities, listening to music, family activities, watching TV and movies, activities with friends  ACTIVITY PREFERENCE:  Small group  ACTIVITY TYPES:  Passive. Indoor. Outdoor. Active. COGNITION:  A&Ox3 - appeared confused    COPING:    poor  ATTENTION:   poor  RELAXATION:   Patient appeared anxious and paranoid. Patient reported poor sleep. SELF-ESTEEM: fair  MOTIVATION:     Poor - poor insight    SOCIAL SKILLS:   poor  FRUSTRATION TOLERANCE:  fair  ATTENTION SEEKING:  Patient anxious and paranoid. COOPERATION:   Cooperative but anxious  AFFECT:   blunt  APPEARANCE:   appropriate    HEARING:    No problems noted  VISION:  No problems noted  VERBAL COMMUNICATION:   Patient appeared confused and had some difficulty with her speech at times.    WRITTEN COMMUNICATION:   Did not assess at this time    COORDINATION:   No problems noted but patient has a history of COPD. MOBILITY:  Ambulates independently   GOALS:    Identify 2 new positive coping skills by time of discharge.

## 2021-12-16 NOTE — CARE COORDINATION
Discharge Planning Update:  Planning for Adult Psych today. Sascha Suero did sign the voluntary admission. Phone call placed to Psych, spoke to Armando Mccloud. She states they have to reach out to Dr. Lorenza Goldstein to verify they will be able to accept patient due to her wearing oxygen. Sascha Suero is walking in her room with no oxygen on. She does not wear oxygen at baseline. Awaiting to hear back from Psych. Electronically signed by Fritzi Goldberg, RN on 12/16/21 at 11:59 AM EST         12/16/21, 12:21 PM EST    Patient goals/plan/ treatment preferences discussed by  and . Patient goals/plan/ treatment preferences reviewed with patient/ family. Patient/ family verbalize understanding of discharge plan and are in agreement with goal/plan/treatment preferences. Understanding was demonstrated using the teach back method. AVS provided by RN at time of discharge, which includes all necessary medical information pertaining to the patients current course of illness, treatment, post-discharge goals of care, and treatment preferences. IMM Letter  IMM Letter given to Patient/Family/Significant other/Guardian/POA/by[de-identified] intake  IMM Letter date given[de-identified] 12/14/21  IMM Letter time given[de-identified] 2312     Phone call received from [facility]. She states they are accepting Sascha Suero and will have a room available this afternoon. She will go to 56A, Update to primary RN, Arianna Sanders.

## 2021-12-16 NOTE — PROGRESS NOTES
Pt is in her room sobbing, very anxious since her arrival to the unit, pt confused and unable to sign consents, tried to attend group, had Ativan on the other unit.  Awaiting orders

## 2021-12-16 NOTE — FLOWSHEET NOTE
Ronaldoscar Chatterjee came into group late and sat silently. She did not want to share but listened. Darrick Blank participated in the spirituality group. He/she learned the following spiritual need:  Belonging, meaning and purpose, acceptance, values, awe. He/she has the  mini ways of wellness paper.

## 2021-12-16 NOTE — PROGRESS NOTES
Psychosocial Assessment    Current Level of Psychosocial Functioning     Independent  XXX  Dependent    Minimal Assist     Comments:      Psychosocial High Risk Factors (check all that apply)    Unable to obtain meds   Chronic illness/pain    Substance abuse   Lack of Family Support  XXX  Financial stress   Isolation   Inadequate Community Resources  Suicide attempt(s)  Not taking medications   Victim of crime   Developmental Delay  Unable to manage personal needs    Age 72 or older   Homeless  No transportation   Readmission within 30 days  Unemployment XXX  Traumatic Event    Family/Supports identified: \"I've been a loner for a long time\"    Sexual Orientation:  Heterosexual    Patient Strengths: Motivated for change    Patient Barriers: Lack of family support, recent admissions    Safety plan: On-going, Q15 minute safety checks    CMHC/MH history: See clinical summary for details    Plan of Care:  medication management, group/individual therapies, family meetings, psycho -education, treatment team meetings to assist with stabilization    Initial Discharge Plan:  Patient will return home and follow up outpatient. Possible [place] referral.    Clinical Summary:      Patient is a 49year old female admitted to the unit directly from a medical unit voluntarily. Patient reports \"a few months ago I woke up confused, disoriented and my legs were half numb. I didn't know what was going on\". Patient states \"one day I just wasn't me anymore\". Patient reports everything has changed including routines, she no longer knows what she is doing. Patient reports she has moments of panic, screaming, hyperventilating and screaming \"what's happening\". Patient reports everyone is going on about their lives and all she can do is just stand there. Patient reports she is stuck in this zone. Patient reports she has been somewhat delusional and can't make decisions.  Patient reports she has been going through the motions, unable to make decisions. Patient reports her functioning has diminished and a toddler can function better than her. Patient reports she feels as thought she is a body without a brain. Patient reports she had great clarity yesterday while on the medical unit and woke up this morning to all of that clarity being gone. Patient receives SSI. Patient had been staying with her sister, it is unclear if she will be returning there.

## 2021-12-16 NOTE — PROGRESS NOTES
Behavioral Health   Admission Note     Admission Type:   Admission Type: Voluntary    Reason for admission:  Reason for Admission: Somehow I lost my mind    PATIENT STRENGTHS:  Strengths: Positive Support, Motivated    Patient Strengths and Limitations:  Limitations: Tendency to isolate self, Hopeless about future    Addictive Behavior:   Addictive Behavior  In the past 3 months, have you felt or has someone told you that you have a problem with:  : None  Do you have a history of Chemical Use?: No  Do you have a history of Alcohol Use?: No  Do you have a history of Street Drug Abuse?: Comment (MJ in tox screen)  Histroy of Prescripton Drug Abuse?: No    Medical Problems:   Past Medical History:   Diagnosis Date    Anxiety     COPD (chronic obstructive pulmonary disease) ([facility])        Status EXAM:  Status and Exam  Normal: No  Facial Expression: Worried, Sad  Affect: Unstable  Level of Consciousness: Confused  Mood:Normal: No  Mood: Anxious, Sad, Suspicious, Irritable  Motor Activity:Normal: No  Motor Activity: Increased  Interview Behavior: Irritable  Preception: [place] to Person, [place] to Time, [place] to Place  Attention:Normal: Yes  Thought Processes: Tangential  Thought Content:Normal: No  Thought Content: Preoccupations  Hallucinations: Visual (Comment) (sees spots and white shadows in the corners of her eyes)  Delusions: No  Memory:Normal: No  Memory: Poor Recent  Insight and Judgment: Yes  Present Suicidal Ideation: No  Present Homicidal Ideation: No    Pt admitted with followings belongings:  Dental Appliances: None  Vision - Corrective Lenses: None  Hearing Aid: None  Jewelry: None  Body Piercings Removed: N/A  Clothing:  Footwear, Jacket / coat, Pants, Shirt, Undergarments (Comment) (2 coats, gloves and hat, belt)  Were All Patient Medications Collected?: Yes  Other Valuables: Cell phone, Money (Comment), Purse, Wallet (cigarettes and lighters, $13.21, various cards, )     Admission order obtained YES.  Valuables sent home with N/A. Valuables placed in safe in security envelope, number:  N/A. Patient's home medications were locked in cupboard, [id number]. Patient oriented to surroundings and program expectations and copy of patient rights given. Received admission packet:  YES. Consents reviewed, signed NO, pt unable to concentrate. \"An Important Message from [employer] About Your Rights\" form reviewed, signed NO, pt unable to concentrate. . Refused  NO, pt unable to concentrate. Patient verbalize understanding:  NO, pt unable to concentrate. .    Patient education on precautions: YES           Patient screened positive for suicide risk on CSSR-S (\"yes\" to question #4, 5, OR 6)  NO. Physician notified of risk score. Orders received NO .  2 person skin assessment completed upon admission pt refused 2 person skin assessment. Explained patients right to have family, representative or physician notified of their admission. Patient has Declined for physician to be notified. Patient has Declined for family/representative to be notified. Provided pt with [employer] handout entitled \"Quitting Smoking. \"  Reviewed handout with pt addressing dangers of smoking, developing coping skills, and providing basic information about quitting. Pt response to counseling:  Pt offered nicotine patch    Pt came to 4E from 5K, pt is confused and worried on arrival, she states that she was better yesterday but is getting worse today. She uses O2 at night and thinks that she has CO2 poisoning. She has been staying with her sister but that is not working. Pt is fidgiting and restless, states that she feels \"disconnected. \" Pt was recently at [facility] but states it was a bad experience. Of note, she states that she was perfectly fine a few months ago.           Medina Adhikari RN

## 2021-12-17 PROBLEM — E43 SEVERE MALNUTRITION (HCC): Status: ACTIVE | Noted: 2021-12-17

## 2021-12-17 LAB
FOLATE: 19.3 NG/ML (ref 4.8–24.2)
VITAMIN B-12: 720 PG/ML (ref 211–911)

## 2021-12-17 PROCEDURE — APPSS30 APP SPLIT SHARED TIME 16-30 MINUTES: Performed by: PHYSICIAN ASSISTANT

## 2021-12-17 PROCEDURE — 6370000000 HC RX 637 (ALT 250 FOR IP): Performed by: PSYCHIATRY & NEUROLOGY

## 2021-12-17 PROCEDURE — 6370000000 HC RX 637 (ALT 250 FOR IP): Performed by: PHYSICIAN ASSISTANT

## 2021-12-17 PROCEDURE — 99222 1ST HOSP IP/OBS MODERATE 55: CPT | Performed by: PSYCHIATRY & NEUROLOGY

## 2021-12-17 PROCEDURE — 1240000000 HC EMOTIONAL WELLNESS R&B

## 2021-12-17 PROCEDURE — 6370000000 HC RX 637 (ALT 250 FOR IP)

## 2021-12-17 RX ORDER — OLANZAPINE 2.5 MG/1
2.5 TABLET ORAL 2 TIMES DAILY
Status: DISCONTINUED | OUTPATIENT
Start: 2021-12-17 | End: 2021-12-20

## 2021-12-17 RX ADMIN — Medication 2 PUFF: at 21:24

## 2021-12-17 RX ADMIN — TIOTROPIUM BROMIDE INHALATION SPRAY 2 PUFF: 3.12 SPRAY, METERED RESPIRATORY (INHALATION) at 21:24

## 2021-12-17 RX ADMIN — CLONAZEPAM 0.5 MG: 0.5 TABLET ORAL at 08:25

## 2021-12-17 RX ADMIN — OLANZAPINE 2.5 MG: 2.5 TABLET, FILM COATED ORAL at 09:52

## 2021-12-17 RX ADMIN — BUSPIRONE HYDROCHLORIDE 10 MG: 10 TABLET ORAL at 08:25

## 2021-12-17 RX ADMIN — CLONAZEPAM 0.5 MG: 0.5 TABLET ORAL at 21:15

## 2021-12-17 RX ADMIN — BUSPIRONE HYDROCHLORIDE 10 MG: 10 TABLET ORAL at 21:15

## 2021-12-17 RX ADMIN — PANTOPRAZOLE SODIUM 40 MG: 40 TABLET, DELAYED RELEASE ORAL at 08:25

## 2021-12-17 RX ADMIN — OLANZAPINE 2.5 MG: 2.5 TABLET, FILM COATED ORAL at 21:15

## 2021-12-17 RX ADMIN — ATORVASTATIN CALCIUM 40 MG: 40 TABLET, FILM COATED ORAL at 21:15

## 2021-12-17 ASSESSMENT — PAIN SCALES - GENERAL
PAINLEVEL_OUTOF10: 0
PAINLEVEL_OUTOF10: 0

## 2021-12-17 NOTE — CONSULTS
Comprehensive Nutrition Assessment    Type and Reason for Visit:  Initial, Positive Nutrition Screen (Weight Loss/Decreased Appetite/Intake)    Nutrition Recommendations/Plan:   *Recommend a Multivitamin w/minerals daily. *Continue Ensure Enlive TID. *Continue current diet. Nutrition Assessment: Pt. severely malnourished AEB criteria listed below. At risk for further nutritional compromise r/t admit d/t decline in mental status and underlying medical condition (hx COPD, drug abuse). Nutrition recommendations/interventions as per above. Malnutrition Assessment:  Malnutrition Status:  Severe malnutrition    Context:  Acute Illness     Findings of the 6 clinical characteristics of malnutrition:  Energy Intake:  7 - 50% or less of estimated energy requirements for 5 or more days  Weight Loss:  Unable to assess (Awaiting admit weight yet)     Body Fat Loss:  7 - Moderate body fat loss Orbital   Muscle Mass Loss:  7 - Moderate muscle mass loss Temples (temporalis), Clavicles (pectoralis & deltoids)  Fluid Accumulation:  No significant fluid accumulation Extremities   Strength:  Not Performed    Estimated Daily Nutrient Needs:  Energy (kcal):  4892-4855 kcal/day (30-35 kcal/kg); Weight Used for Energy Requirements:  Current (59 kg)     Protein (g):  89+ g/day (1.5+ g/kg); Weight Used for Protein Requirements:  Current (59 kg)          Nutrition Related Findings:  Admit d/t decline in metal status; pt seen- very confused during visit but able to answer some questions; pt reports poor intake of meals over the past week consuming only one meal daily with unplanned weight loss of 15 lbs over the past 4 months; pt states she has been eating better since admit but has yet to try the Ensure shakes but is amenable to continue them TID; pt denies N/V or chewing/swallowing difficulty with food; no BM yet. Rx includes: Lipitor.     Wounds:  None       Current Nutrition Therapies:    ADULT ORAL NUTRITION SUPPLEMENT; Breakfast, Lunch, Dinner; Standard High Calorie/High Protein Oral Supplement  ADULT DIET; Regular    Anthropometric Measures:  · Height: 5' 10\" (177.8 cm)  · Current Body Weight: 130 lb (59 kg) (12/17; stated; no edema noted)   · Admission Body Weight: 130 lb (59 kg) (12/17; stated; no edema noted)    · Usual Body Weight:  (Per EMR: 135 lb on 12/4/21)     · Ideal Body Weight: 150 lbs  · BMI: 18.7  · BMI Categories: Normal Weight (BMI 18.5-24. 9)       Nutrition Diagnosis:   · Severe malnutrition, In context of acute illness or injury related to inadequate protein-energy intake as evidenced by poor intake prior to admission, moderate loss of subcutaneous fat, moderate muscle loss    Nutrition Interventions:   Food and/or Nutrient Delivery:  Continue Current Diet, Start Oral Nutrition Supplement, Vitamin Supplement  Nutrition Education/Counseling:  Education initiated (Encouraged po intake of meals at best effort)   Coordination of Nutrition Care:  Continue to monitor while inpatient    Goals:  Pt will consume 75% or more of meals during LOS       Nutrition Monitoring and Evaluation:   Behavioral-Environmental Outcomes:  None Identified   Food/Nutrient Intake Outcomes:  Food and Nutrient Intake, Supplement Intake, Vitamin/Mineral Intake  Physical Signs/Symptoms Outcomes:  Weight, Nutrition Focused Physical Findings, Skin, Biochemical Data, GI Status, Fluid Status or Edema     Discharge Planning:     Too soon to determine     Electronically signed by Rodri Harmon, MS, RD, LD on 12/17/21 at 2:08 PM EST    Contact: [phone]

## 2021-12-17 NOTE — PLAN OF CARE
Patient has not attended any of the groups and has not been out of her room to socialize with others so she has not met her socialization goal for this shift. Patient will be encouraged to come out of her room often to socialize and to also attend groups daily.

## 2021-12-17 NOTE — PLAN OF CARE
Problem: Discharge Planning:  Goal: Discharged to appropriate level of care  Description: Discharged to appropriate level of care  Outcome: Ongoing  Note: Patient hopes to return to her home after discharge     Problem: Altered Mood, Deterioration in Function:  Goal: Ability to perform activities of daily living will improve  Description: Ability to perform activities of daily living will improve  Outcome: Ongoing  Note: Pt performing own ADL at hospital but does admit to difficulty remembering at home  Goal: Able to verbalize reality based thinking  Description: Able to verbalize reality based thinking  Outcome: Ongoing  Note: Alert and oriented to all but some memory loss  Goal: Participates in care planning  Description: Participates in care planning  Outcome: Ongoing  Note: Pt is taking medications, attending and participating in groups and care planning. Pt has good interaction with staff and peers      Problem: Altered Mood, Psychotic Behavior:  Goal: Able to verbalize decrease in frequency and intensity of hallucinations  Description: Able to verbalize decrease in frequency and intensity of hallucinations  Outcome: Ongoing  Note: Denies hallucinations and is not seen interacting with internal stimuli     Problem: Substance Abuse:  Goal: Absence of drug withdrawal signs and symptoms  Description: Absence of drug withdrawal signs and symptoms  Outcome: Ongoing  Note: Denies withdrawal signs and symptoms     Problem: OXYGENATION/RESPIRATORY FUNCTION  Goal: Able to breathe comfortably  Outcome: Ongoing  Note: Denies respiratory distress and oxygenation is 94 on room air     Problem: Altered Mood, Deterioration in Function:  Goal: Patient specific goal  Description: Patient specific goal  Outcome: Not Met This Shift  Note: Pt did not attend group   Care plan reviewed with patient and verbalize understanding of the plan of care and contribute to goal setting.

## 2021-12-17 NOTE — H&P
Department of Psychiatry  Psychiatric Assessment   Reason for Admission to Psychiatric Unit:  Acute disordered/bizarre behavior or psychomotor agitation or retardation;interferes with ADLs so that patient cannot function at a less intensive care level of care during evaluation and treatment   A mental disorder causing major disability in social, interpersonal, occupational, and/or educational functioning that is leading to dangerous or life-threatening functioning, and that can only be addressed in an acute inpatient setting   A mental disorder that causes an inability to maintain adequate nurtrition or self-care, and family/community support cannot provide reliable, essential care, so that the patient cannont function at a less intensive level of care during evaluation and treatment   Concerns about patient's safety in the community    CHIEF COMPLAINT: Psychosis, unable to care for self at home    HISTORY OF PRESENT ILLNESS:      Osbaldo Foley is a 49 y.o. female with a history of cannabis use, IVDU, anxiety and depression who was admitted directly from the medical floor for psychosis and inability to care for self. Patient was seen and evaluated by psychiatric consult services on 12/15/2021. Per the consult note:   \"Per chart review, patient presented to the ED with her family and was referred to the ED by her PCP for further evaluation of ongoing issues over the past four months. Per the patient's family, the past several months patient has had a decline in her mental status and ability to function in daily life.  Family reports this started out after her neighbor's house had cockroaches. She put Borax around her house. Papi Doran moved away then the next neighbors had mice.  She then put Decon around her house.  Ms. [address] started to have white substance in her mouth and throat and was concerned that she was poisoned by the substances she placed around her house.  The patient had chest pain and palpitations. Family reported that this just was the start of the decline in her mental status.  The patient went to multiple ERs and her PCP for a multitude of medical complaints. Patient had a head CT at [facility] on 0308 that was normal and a visit here on 124 and was discharged.  2.5 weeks ago she was seen in [facility] and was admitted to a [place] coping center where it was considered putting the patient on dementia medicines.   Patient felt her psych medications were not working so her Celexa was changed to Seroquel.  She continued her [address] the symptoms worsened so she stopped both those medications.  Patient has been confused, cannot make decisions, and feels she cannot eat.  Ms. Khan has been afraid to sleep for fear that she is going to die. Mikki Moore is scared to be by herself. Mandy Auguste reported that she was never like this before. Mikki Moore is normally an upbeat person who cared for herself and lived on her own. Bobby Riley is now living with her sister and her sister can no longer take care of her.  The patient cannot function on her own. Minh Siu family took the patient to her PCP who sent her to the ER for admission and psychiatric and medical evaluations. Fausto Grangers admission, when asked about physical symptoms, patient had difficulty answering questions. Bobby Riley is not suicidal or homicidal.  She denies nausea or vomiting.  Patient smokes cigarettes and marijuana but denies alcohol or other illicit drug use.  Patient is vaccinated against Covid.    Patient later shared a history of IVDU and high risk sexual behavior 30 years ago. Mikki Moore was never treated for STDs but admits having many unprotected partners \"when I was a crackhead\".  She was  4 times and is now .  Bobby Riley also complained of a \"thick white coating\" inside of her mouth that has been coming and going over the past 4 months.     Psychiatry was consulted due to altered mental status and the patient's inability to care for self     Charolet Hamman reports she has been experiencing an overwhelming sense of paranoia and disconnect lately. Ranjan Higgins says she has been feeling very forgetful and confused at times. She mentions her episodes of confusion are usually worse in the morning after she wakes up and diminish at night. She says this all started around August when she had mice in the home and messed with Decon at that time.  Shortly after that, she developed a white film on her tongue, experienced dry mouth, increased mucus production, heart palpitations,dizziness and had red glassy eyes.  She has also been experiencing abdominal pain, constipation and increased flatulence since then. She reports she has not been able to take care of herself lately. Ranjan Higgins has not been able to make simple decisions such as what she wants to eat.  She reports she always feels like she doesn't know. She says she was recently in the [facility] for 3 days and was not able to order her breakfast.  She says during that time, she felt like a \"looney tune\" and was hysterically crying but not producing tears. She states she is currently living with her sister because of this but her sister has not been able to care for either. Roraina Callaway to August, she was living on her own and was adequately taking care of herself. Ranjan Higgins says she will be with other people but still feels very disconnected.  She also has been feeling like the TV is overstimulating at times. Ranjan Higgins says she feels like she is impaired or on drugs.  She also has been experiencing a lot of shakiness and restlessness.  She says she has been experiencing real bad panic and fear. Ranjan Higgins has not been driving because she is afraid that she will appear impaired to law enforcement.  She states she has been feeling paranoid that her phone is going to be hacked and her personal information will be leaked. Ranjan Higgins talks about how she has been poor all of her life but received a inheritance from her aunt upon her passing. Ranjan Higgins says she has also been paranoid that her family members are trying to kill her in order to get the money.    She reports her appetite has been poor. She has not been sleeping well but reports she has not been utilizing her nightly oxygen at home.     Patient is restless and animated during the examination. She has rapid and pressured speech. She is able to hold a relevant conversation with this writer but does exhibit tangentiality and circumstantiality at times. She is fully oriented to person place situation and time during the interview. She recognizes that she has been experiencing paranoid but knows that they are delusions. She continue to endorse severe anxiety and reports Ativan has been helpful for her. She denies any suicidal or homicidal ideation. Denies any hallucinations. \"    Today:  Courtney Severino is seen in her room shortly after waking up. She reports her confusion is really bad today. She says she does not understand what is going on she was okay on Wednesday because but woke up yesterday and today confused. She said when she was at home, she would be confused every day and was not able to function. When asked how she was not able to function, she said she was unable to express herself. She reports she was so nervous and scared and felt in a fog. She says she could see everyone else being normal but still could not comprehend things. Patient reports she feels very sick today and had a fever last night. Per staff documentation, her temperature was 97.8 last night. During the interview, the patient appears distressed and has a hard time answering questions. She does not answer if she is currently feeling anxious or paranoid at this time. However, she appears overtly anxious. She repeatedly says that she cannot think, is confused and could not function at home.   She appears to be fully oriented as she is able to recall the events that led to her admission and the conversation this writer had with her on Wednesday when she was seen by consult services on the medical floor. On arrival to the unit yesterday, she was sobbing, very anxious and unable to complete the admission process. Staff informed this writer and Klonopin 0.5 mg twice daily was added at that time. Since admission, the patient has been completing her ADLs with no issues. Staff reported she has been going back and forth between being oriented and disoriented to situation. Staff did report last night she came up to the nurses station with a couple of [address] and asked staff why her water looked funny.     Carlos Genao HISTORY:      · Outpatient psychiatric provider: PCP  · Suicide attempts: denies  · Inpatient psychiatric admissions: was recently admitted to the [facility] in November for 3 days  · Home Medication Compliance: poor- took herself off of some of her medications prior to admission     Past psychiatric medications includes:     Seroquel, Buspar and Celexa  Adverse reactions from psychotropic medications:     no         Past Medical History:        Diagnosis Date    Anxiety     COPD (chronic obstructive pulmonary disease) ([facility] [address]        Past Surgical History:    No past surgical history on file.     Medications Prior to Admission:   Medications Prior to Admission: busPIRone (BUSPAR) 10 MG tablet, Take 10 mg by mouth 2 times daily Indications: Feeling Anxious  atorvastatin (LIPITOR) 40 MG tablet, Take 40 mg by mouth daily Indications: High Amount of Fats in the Blood  omeprazole (PRILOSEC) 20 MG delayed release capsule, Take 20 mg by mouth daily Indications: Heartburn  albuterol sulfate HFA (VENTOLIN HFA) 108 (90 Base) MCG/ACT inhaler, Inhale 2 puffs into the lungs every 4 hours as needed for Wheezing Indications: Chronic Obstructive Lung Disease  budesonide-formoterol (SYMBICORT) 160-4.5 MCG/ACT AERO, Inhale 2 puffs into the lungs 2 times daily Indications: Chronic Obstructive Lung Disease  Umeclidinium Bromide (INCRUSE ELLIPTA) 62.5 MCG/INH Skin: Negative for puritis. Neurological: Negative for dizziness, weakness and headaches. All other systems reviewed and are negative. PHYSICAL EXAM:  Vitals:  /77   Pulse 78   Temp 97 °F (36.1 °C) (Tympanic)   Resp 16   Ht 5' 10\" (1.778 m)   Wt 130 lb (59 kg)   SpO2 95%   BMI 18.65 kg/m²     Pain Level: No pain reported at this time      Physical Exam:    Constitutional: Well developed, well nourished, no acute distress  Eyes: Pupils round and reactive to light bilaterally  Neck:  Supple, no thyromegaly. Cardiovascular:  Normal rate and rhythm, normal S1 and S2. No murmur or gallop on auscultation. Radial pulses 2+ and brisk bilaterally  Lungs: Clear to auscultation bilaterally without wheezing or rales. Musculoskeletal:  Full range of motion in all four extremities. Neurologic:  Cranial nerves II through XII are grossly intact. Normal gait and station.        Mental Status Examination:    Level of consciousness:  Within normal limits  Appearance: Thin, hospital attire, seated on side of bed, fair grooming  Behavior/Motor: Rubbing head/face, appears distressed at times  Attitude toward examiner:  cooperative, attentive and good eye contact  Speech: Normal rate and volume  Mood:  Anxious  Affect: mood congruent  Thought processes: circumstantial  Thought content: denies suicidal ideations              denies homicidal ideations               denies hallucinations              No evidence of delusions  Cognition:  Oriented to self, situation, location, date  Concentration clinically adequate  Memory age appropriate  Insight & Judgment:  fair        DSM-5 DIAGNOSIS:    Acute psychosis  Generalized anxiety disorder  Panic disorder without agoraphobia  Cannabis use disorder     Patient Active Problem List   Diagnosis    Altered mental status    [facility]ch[facility] obstructive [facility] disease) [facility] [address] [facility]    [facility]          Psychosocial and Contextual Factors:   Living situation Past Medical History:   Diagnosis Date    Anxiety     COPD (chronic obstructive pulmonary disease) ([facility] [address]           TREATMENT PLAN  Risk Management:  close watch per standard protocol  Psychotherapy:  participation in milieu and group and individual sessions with Attending Physician,  and Physician Assistant/CNP  Estimated length of stay: It might take more than 2 midnights to stabilize patient's mood/thoughts and titrate medications to effect. GENERAL PATIENT/FAMILY EDUCATION  Patient will understand basic signs and symptoms, Patient will understand benefits/risks and potential side effects from proposed meds and Patient will understand their role in recovery. Family is  active in patient's care. Patient assets that may be helpful during treatment include: Intent to participate and engage in treatment, sufficient fund of knowledge and intellect to understand and utilize treatments. Risk level: High     Goals:    Reviewed labs  Reviewed EKG  Will obtain records and review them today. Medication adjustment: We will continue Klonopin 0.5 mg twice daily and BuSpar 10 mg twice daily. Will add Zyprexa 2.5 mg twice daily  We will also order PT/OT consults as patient mentioned possible nursing home placement to social work yesterday  Consults: None  Encouraged patient to engage in groups, milieu, and individual therapies offered as part of programing. Behavioral Services  Medicare Certification Upon Admission    I certify that this patient's inpatient psychiatric hospital admission is medically necessary for:   X (1) Treatment which could reasonably be expected to improve this patient's condition,      X (2) Or for diagnostic study;     AND     X (2) The inpatient psychiatric services are provided while the individual is under the care of a physician and are included in the individualized plan of care.     Estimated length of stay/service: Greater than two midnights will be required to reach therapeutic levels of medications and to stabilize mood    Plan for post-hospital care: Follow up with outpatient psychiatric services    Electronically signed by Jenna Omer PA-C on 12/17/2021 at 12:32 PM                                       Psychiatry Attending Attestation     I assessed this patient and reviewed the case and plan of care with Jenna Omer PA-C. I have reviewed the above documentation and I agree with the findings and treatment plan with the following updates. Patient was evaluated by Jenna Omer PA-C on the unit in person and I evaluated patient as Tele visit. Patient is a 61-year-old female with history of cannabis abuse and severe anxiety initially admitted to the medical floor for concerns of psychosis and inability to care for self. She was evaluated by as on the medical floor and we recommended admission to inpatient psych after she is medically stable. Please refer to the inpatient consult note for further information on initial presentation. Patient continues to report that she is feeling confused and is having hard time recollecting anything. Appears visibly distressed. Staff reports that she was very confused yesterday. She was evaluated for similar episodes in the past and we are considering severe anxiety versus acute delirium versus remnant effects of rat poisoning. Agree with the plan to start Zyprexa and Klonopin to help with any paranoia and severe anxiety. Yaa Hernadez is a 49 y.o. female being evaluated by a Virtual Visit (video visit) encounter to address concerns as mentioned above. A caregiver was present in the room along with the patient. Patient is present at [address] [place] [address] [address] [place] [address] and I am physically present at my home in [facility]     --Kamaljit Flores MD on 12/17/2021 at 6:03 PM    An electronic signature was used to authenticate this note. **This report has been created using voice recognition software.  It may contain minor errors which are inherent in voice recognition technology. **

## 2021-12-17 NOTE — PROGRESS NOTES
Behavioral Services  Medicare Certification Upon Admission    I certify that this patient's inpatient psychiatric hospital admission is medically necessary for:    [x] (1) Treatment which could reasonably be expected to improve this patient's condition,       [x] (2) Or for diagnostic study;     AND     [x](2) The inpatient psychiatric services are provided while the individual is under the care of a physician and are included in the individualized plan of care.     Estimated length of stay/service 3-5 days    Plan for post-hospital care hc    Electronically signed by Vanessa Pang MD on 12/17/2021 at 7:47 AM

## 2021-12-17 NOTE — PLAN OF CARE
Problem: Discharge Planning:  Goal: Discharged to appropriate level of care  Description: Discharged to appropriate level of care  12/17/2021 1150 by Audelia Larios RN  Outcome: Ongoing  Note: Patient voices no needs before discharge. Patient plans to be discharged to home with sister. Discharge planner working with patient to achieve optimal discharge plans, specific to individual needs. Problem: Altered Mood, Deterioration in Function:  Goal: Ability to perform activities of daily living will improve  Description: Ability to perform activities of daily living will improve  12/17/2021 1150 by Audelia Larios RN  Outcome: Ongoing  Note: Patient able to perform ADL's so far today. Problem: Altered Mood, Deterioration in Function:  Goal: Able to verbalize reality based thinking  Description: Able to verbalize reality based thinking  12/17/2021 1150 by Audelia Lairos RN  Outcome: Ongoing  Note: Patient alert and oriented x 3, not to situation,  patient reports \"I just can't think\". Problem: Altered Mood, Deterioration in Function:  Goal: Participates in care planning  Description: Participates in care planning  12/17/2021 1150 by Audelia Larios RN  Outcome: Ongoing  Note: Patient discussed treatment plan with physician/medical staff, not attending group, and compliant with medications. Problem: Altered Mood, Deterioration in Function:  Goal: Patient specific goal  Description: Patient specific goal  12/17/2021 1150 by Audelia Larios RN  Outcome: Ongoing  Note: Patient reports goal for today is to \"find out what is causing my confusion\".   Patient continues to work towards goal.       Problem: Altered Mood, Psychotic Behavior:  Goal: Able to verbalize decrease in frequency and intensity of hallucinations  Description: Able to verbalize decrease in frequency and intensity of hallucinations  12/17/2021 1150 by Audelia Larios RN  Outcome: Ongoing  Note: Patient denies hallucinations this am.     Problem: Substance Abuse:  Goal: Absence of drug withdrawal signs and symptoms  Description: Absence of drug withdrawal signs and symptoms  12/17/2021 1150 by Carloe Isaac RN  Outcome: Ongoing  Note: Patient denies withdrawal symptoms at present time. Problem: OXYGENATION/RESPIRATORY FUNCTION  Goal: Able to breathe comfortably  12/17/2021 1150 by Carole Isaac RN  Outcome: Ongoing  Note: Patient reports \"I feel like I a bad cold, I have a stuffy nose and sore throat\". Patient has congested cough. Patient using oxygen 2 liter while sleeping. Care plan reviewed with patient. Patient verbalize understanding of the plan of care and contribute to goal setting.

## 2021-12-17 NOTE — BH NOTE
PLAN OF CARE:     Start Time: 0900  End Time:  0945    Group Topic:  Daily Goals    Group Type:   Goal Group    Intervention/Goal:  To increase support and identify daily goals    Attendance:  Participated in group    Affect:   restricted    Behavior:   Cooperative but anxious    Response: Patient appeared confused. Daily Goal:  To find out what is causing my confusion.      Progress:  Progressing to goal

## 2021-12-18 PROCEDURE — 94640 AIRWAY INHALATION TREATMENT: CPT

## 2021-12-18 PROCEDURE — 94760 N-INVAS EAR/PLS OXIMETRY 1: CPT

## 2021-12-18 PROCEDURE — 99231 SBSQ HOSP IP/OBS SF/LOW 25: CPT | Performed by: NURSE PRACTITIONER

## 2021-12-18 PROCEDURE — 1240000000 HC EMOTIONAL WELLNESS R&B

## 2021-12-18 PROCEDURE — 6370000000 HC RX 637 (ALT 250 FOR IP): Performed by: PHYSICIAN ASSISTANT

## 2021-12-18 PROCEDURE — 6370000000 HC RX 637 (ALT 250 FOR IP)

## 2021-12-18 PROCEDURE — 6370000000 HC RX 637 (ALT 250 FOR IP): Performed by: PSYCHIATRY & NEUROLOGY

## 2021-12-18 RX ORDER — HYDROXYZINE HYDROCHLORIDE 25 MG/1
50 TABLET, FILM COATED ORAL 3 TIMES DAILY PRN
Status: DISCONTINUED | OUTPATIENT
Start: 2021-12-18 | End: 2021-12-22 | Stop reason: HOSPADM

## 2021-12-18 RX ORDER — HYDROXYZINE HYDROCHLORIDE 25 MG/1
50 TABLET, FILM COATED ORAL 3 TIMES DAILY PRN
Status: DISCONTINUED | OUTPATIENT
Start: 2021-12-18 | End: 2021-12-18

## 2021-12-18 RX ORDER — TRAZODONE HYDROCHLORIDE 50 MG/1
50 TABLET ORAL NIGHTLY PRN
Status: DISCONTINUED | OUTPATIENT
Start: 2021-12-18 | End: 2021-12-22 | Stop reason: HOSPADM

## 2021-12-18 RX ADMIN — CLONAZEPAM 0.5 MG: 0.5 TABLET ORAL at 20:25

## 2021-12-18 RX ADMIN — TRAZODONE HYDROCHLORIDE 50 MG: 50 TABLET ORAL at 00:18

## 2021-12-18 RX ADMIN — OLANZAPINE 2.5 MG: 2.5 TABLET, FILM COATED ORAL at 08:32

## 2021-12-18 RX ADMIN — CLONAZEPAM 0.5 MG: 0.5 TABLET ORAL at 08:32

## 2021-12-18 RX ADMIN — ATORVASTATIN CALCIUM 40 MG: 40 TABLET, FILM COATED ORAL at 20:24

## 2021-12-18 RX ADMIN — TIOTROPIUM BROMIDE INHALATION SPRAY 2 PUFF: 3.12 SPRAY, METERED RESPIRATORY (INHALATION) at 08:49

## 2021-12-18 RX ADMIN — PANTOPRAZOLE SODIUM 40 MG: 40 TABLET, DELAYED RELEASE ORAL at 08:33

## 2021-12-18 RX ADMIN — BUSPIRONE HYDROCHLORIDE 10 MG: 10 TABLET ORAL at 20:24

## 2021-12-18 RX ADMIN — Medication 2 PUFF: at 08:50

## 2021-12-18 RX ADMIN — HYDROXYZINE HYDROCHLORIDE 50 MG: 25 TABLET, FILM COATED ORAL at 00:18

## 2021-12-18 RX ADMIN — OLANZAPINE 2.5 MG: 2.5 TABLET, FILM COATED ORAL at 20:24

## 2021-12-18 RX ADMIN — HYDROXYZINE HYDROCHLORIDE 50 MG: 25 TABLET, FILM COATED ORAL at 17:35

## 2021-12-18 RX ADMIN — TRAZODONE HYDROCHLORIDE 50 MG: 50 TABLET ORAL at 20:24

## 2021-12-18 RX ADMIN — BUSPIRONE HYDROCHLORIDE 10 MG: 10 TABLET ORAL at 08:33

## 2021-12-18 ASSESSMENT — PAIN SCALES - GENERAL: PAINLEVEL_OUTOF10: 0

## 2021-12-18 NOTE — BH NOTE
Therapist offered [employer] and Goal Group therapy session at 1330 today. Pt was provided maximum verbal encouragement to attend. Therapist provided recreational activities for patients to engage in on the unit. Pt resting in room during both group therapy and recreational activities and did not participate. Encouragement was offered.

## 2021-12-18 NOTE — PROGRESS NOTES
Pt able to take a shower, states that she does not think she will ever be able to do it again. Worried that her family does not know she is not well. Believes that she was well yesterday and can't understand how she got this was. Pt unable to make decisions. Pt tearful. Pt able to get her own dinner tray and set it up and eats up to 100%.

## 2021-12-18 NOTE — GROUP NOTE
Group Therapy Note    Date: 12/18/2021    Group Start Time: 1500  Group End Time: 8038  Group Topic: Healthy Living/Wellness    [facility] Adult [facility] 4E    Brenda Neff RN        Group Therapy Note    Attendees: 11         Notes:  Met with patients individually and discussed the importance of self care. Handouts with self care tips and self care plan worksheet activity given to patients to complete as a room reflection project. Status After Intervention:  Improved    Participation Level:  Active Listener    Participation Quality: Appropriate      Speech:  normal      Thought Process/Content: Logical      Affective Functioning: Congruent            Level of consciousness:  Alert      Response to Learning: Able to verbalize current knowledge/experience      Endings: None Reported    Modes of Intervention: Education, Support, Socialization, Problem-solving and Activity      Discipline Responsible: Registered Nurse      Signature:  Brenda Neff RN

## 2021-12-18 NOTE — PROGRESS NOTES
Psychiatry Progress Note      12-    CC: Psychosis; Unable to care for self at home. Subjective    Progress:  Chris Carbajal is irritable this am. Paranoid. Has multiple somatic complaints. States she does not feel like her self. Reports having depression. Denies feelings of harm towards self or others. Anxious reports does not know if she will ever get better. Compliant  with medication. No side effects reported. Reports appetite and sleep are \"ok\" Verified slept 7.5 last night and woke x2. Denies going to groups. Denies getting visits or talking to anyone on phone. Objective  /70   Pulse 76   Temp 98 °F (36.7 °C) (Tympanic)   Resp 18   Ht 5' 10\" (1.778 m)   Wt 130 lb (59 kg)   SpO2 96%   BMI 18.65 kg/m²      MSE:  Level of consciousness: Alert  Appearance: hospital attire, in chair and fair grooming   Behavior/Motor: no abnormalities noted   Attitude toward examiner: cooperative   Speech: Normal volume, circumstantial  Mood: Irritable; Dysthymic   Affect: Reactive  Thought processes: [place]  Suicidal Ideation: Denies suicidal ideations  Homicidal ideation: Denies homicidal ideations  Delusions: Paranoid  Perceptual Disturbance: Denies AH/VH  Cognition: Oriented to person, place, time  Concentration fair   Memory intact   Insight: Limited  Judgment: Limited    Assessment:  Acute psychosis  Generalized anxiety disorder  Panic disorder without agoraphobia  Cannabis use disorder     Plan:  Continue current meds as ordered  Continue to encourage group attendance.       Harvinder Vazquez CNP  12-           Acute psychosis  Generalized anxiety disorder  Panic disorder without agoraphobia  Cannabis use disorder     I concur with above clinical findings and plan of care

## 2021-12-18 NOTE — PLAN OF CARE
Problem: Discharge Planning:  Goal: Discharged to appropriate level of care  Description: Discharged to appropriate level of care  12/17/2021 2247 by Cecil Arriaza RN  Outcome: Ongoing  Note: Patient is unsure of her discharge plan. 12/17/2021 1150 by Tiffany Belle RN  Outcome: Ongoing  Note: Patient voices no needs before discharge. Patient plans to be discharged to home with sister. Discharge planner working with patient to achieve optimal discharge plans, specific to individual needs. Problem: Altered Mood, Deterioration in Function:  Goal: Ability to perform activities of daily living will improve  Description: Ability to perform activities of daily living will improve  12/17/2021 2247 by Cecil Arriaza RN  Outcome: Ongoing  Note: Patient does activities of daily living with encouragement. 12/17/2021 1150 by Tiffany Belle RN  Outcome: Ongoing  Note: Patient able to perform ADL's so far today. Goal: Able to verbalize reality based thinking  Description: Able to verbalize reality based thinking  12/17/2021 2247 by Cecil Arriaza RN  Outcome: Ongoing  Note: Patient is oriented to person and place only. 12/17/2021 1150 by Tiffany Belle RN  Outcome: Ongoing  Note: Patient alert and oriented x 3, not to situation,  patient reports \"I just can't think\". Goal: Participates in care planning  Description: Participates in care planning  12/17/2021 2247 by Cecil Arriaza RN  Outcome: Ongoing  Note: Staff attempted to review the care plan with the patient and no understanding verbalized. 12/17/2021 1150 by Tiffany Belle RN  Outcome: Ongoing  Note: Patient discussed treatment plan with physician/medical staff, not attending group, and compliant with medications. Goal: Patient specific goal  Description: Patient specific goal  12/17/2021 2247 by Cecil Arriaza RN  Outcome: Ongoing  Note: Patient did not state a goal this shift.   12/17/2021 1150 by Tiffany Belle RN  Outcome: Ongoing  Note: Patient reports goal for today is to \"find out what is causing my confusion\". Patient continues to work towards goal.       Problem: Altered Mood, Psychotic Behavior:  Goal: Able to verbalize decrease in frequency and intensity of hallucinations  Description: Able to verbalize decrease in frequency and intensity of hallucinations  12/17/2021 2247 by Natali Hays RN  Outcome: Ongoing  Note: Patient denies any hallucinations at present. 12/17/2021 1150 by Rosa Maria Cervantes RN  Outcome: Ongoing  Note: Patient denies hallucinations this am.     Problem: Substance Abuse:  Goal: Absence of drug withdrawal signs and symptoms  Description: Absence of drug withdrawal signs and symptoms  12/17/2021 2247 by Natali Hays RN  Outcome: Ongoing  Note: No withdrawal symptoms noted so far this shift. 12/17/2021 1150 by Rosa Maria Cervantes RN  Outcome: Ongoing  Note: Patient denies withdrawal symptoms at present time. Problem: Coping:  Goal: Ability to identify problematic behaviors that deter socialization will improve  Description: Ability to identify problematic behaviors that deter socialization will improve  12/17/2021 2247 by Natali Hays RN  Outcome: Ongoing  Note: Ongoing. 12/17/2021 1209 by Farmer Daily  Outcome: Not Met This Shift     Problem: OXYGENATION/RESPIRATORY FUNCTION  Goal: Able to breathe comfortably  12/17/2021 2247 by Natali Hays RN  Outcome: Ongoing  Note: Respirations noted easy and unlabored on room air. Pulse ox 96% on room air. 12/17/2021 1150 by Rosa Maria Cervantes RN  Outcome: Ongoing  Note: Patient reports \"I feel like I a bad cold, I have a stuffy nose and sore throat\". Patient has congested cough. Patient using oxygen 2 liter while sleeping. Care plan reviewed with patient.   Patient does not verbalize understanding of the plan of care and does not contribute to goal setting

## 2021-12-18 NOTE — PLAN OF CARE
hallucinations  Description: Able to verbalize decrease in frequency and intensity of hallucinations  12/18/2021 1044 by Blanca Shoemaker RN  Outcome: Met This Shift  Note: Pt denies hallucinations at time of assessment, will continue to monitor  12/17/2021 2247 by Luke Shook RN  Outcome: Ongoing  Note: Patient denies any hallucinations at present. Problem: Substance Abuse:  Goal: Absence of drug withdrawal signs and symptoms  Description: Absence of drug withdrawal signs and symptoms  12/18/2021 1044 by Blanca Shoemaker RN  Outcome: Met This Shift  Note: Patient denies symptoms of withdrawal.   12/17/2021 2247 by Luke Shook RN  Outcome: Ongoing  Note: No withdrawal symptoms noted so far this shift. Problem: Coping:  Goal: Ability to identify problematic behaviors that deter socialization will improve  Description: Ability to identify problematic behaviors that deter socialization will improve  12/18/2021 1044 by Blanca Shoemaker RN  Outcome: Ongoing  12/17/2021 2247 by Luke Shook RN  Outcome: Ongoing  Note: Ongoing. Problem: OXYGENATION/RESPIRATORY FUNCTION  Goal: Able to breathe comfortably  12/18/2021 1044 by Blanca Shoemaker RN  Outcome: Ongoing  Note: Pt denies SOB at time of assessment, will continue to monitor  12/17/2021 2247 by Luke Shook RN  Outcome: Ongoing  Note: Respirations noted easy and unlabored on room air. Pulse ox 96% on room air. Care plan reviewed with patient.   Patient does not verbalize understanding of the plan of care and does contribute to goal setting

## 2021-12-19 LAB — VDRL CONFIRMATION: NON REACTIVE

## 2021-12-19 PROCEDURE — 6370000000 HC RX 637 (ALT 250 FOR IP)

## 2021-12-19 PROCEDURE — 6370000000 HC RX 637 (ALT 250 FOR IP): Performed by: PSYCHIATRY & NEUROLOGY

## 2021-12-19 PROCEDURE — 99231 SBSQ HOSP IP/OBS SF/LOW 25: CPT | Performed by: NURSE PRACTITIONER

## 2021-12-19 PROCEDURE — 1240000000 HC EMOTIONAL WELLNESS R&B

## 2021-12-19 PROCEDURE — 6370000000 HC RX 637 (ALT 250 FOR IP): Performed by: PHYSICIAN ASSISTANT

## 2021-12-19 PROCEDURE — 94640 AIRWAY INHALATION TREATMENT: CPT

## 2021-12-19 RX ORDER — MAGNESIUM HYDROXIDE/ALUMINUM HYDROXICE/SIMETHICONE 120; 1200; 1200 MG/30ML; MG/30ML; MG/30ML
30 SUSPENSION ORAL EVERY 6 HOURS PRN
Status: DISCONTINUED | OUTPATIENT
Start: 2021-12-19 | End: 2021-12-22 | Stop reason: HOSPADM

## 2021-12-19 RX ORDER — ACETAMINOPHEN 325 MG/1
650 TABLET ORAL EVERY 4 HOURS PRN
Status: DISCONTINUED | OUTPATIENT
Start: 2021-12-19 | End: 2021-12-22 | Stop reason: HOSPADM

## 2021-12-19 RX ORDER — MAGNESIUM HYDROXIDE/ALUMINUM HYDROXICE/SIMETHICONE 120; 1200; 1200 MG/30ML; MG/30ML; MG/30ML
30 SUSPENSION ORAL PRN
Status: DISCONTINUED | OUTPATIENT
Start: 2021-12-19 | End: 2021-12-19

## 2021-12-19 RX ADMIN — BUSPIRONE HYDROCHLORIDE 10 MG: 10 TABLET ORAL at 08:29

## 2021-12-19 RX ADMIN — OLANZAPINE 2.5 MG: 2.5 TABLET, FILM COATED ORAL at 20:07

## 2021-12-19 RX ADMIN — ACETAMINOPHEN 650 MG: 325 TABLET ORAL at 02:25

## 2021-12-19 RX ADMIN — CLONAZEPAM 0.5 MG: 0.5 TABLET ORAL at 08:29

## 2021-12-19 RX ADMIN — HYDROXYZINE HYDROCHLORIDE 50 MG: 25 TABLET, FILM COATED ORAL at 12:48

## 2021-12-19 RX ADMIN — TRAZODONE HYDROCHLORIDE 50 MG: 50 TABLET ORAL at 20:07

## 2021-12-19 RX ADMIN — ALUMINUM HYDROXIDE, MAGNESIUM HYDROXIDE, AND SIMETHICONE 30 ML: 200; 200; 20 SUSPENSION ORAL at 13:04

## 2021-12-19 RX ADMIN — ATORVASTATIN CALCIUM 40 MG: 40 TABLET, FILM COATED ORAL at 20:07

## 2021-12-19 RX ADMIN — BUSPIRONE HYDROCHLORIDE 10 MG: 10 TABLET ORAL at 20:07

## 2021-12-19 RX ADMIN — CLONAZEPAM 0.5 MG: 0.5 TABLET ORAL at 20:07

## 2021-12-19 RX ADMIN — OLANZAPINE 2.5 MG: 2.5 TABLET, FILM COATED ORAL at 08:29

## 2021-12-19 RX ADMIN — PANTOPRAZOLE SODIUM 40 MG: 40 TABLET, DELAYED RELEASE ORAL at 08:29

## 2021-12-19 RX ADMIN — ACETAMINOPHEN 650 MG: 325 TABLET ORAL at 08:29

## 2021-12-19 RX ADMIN — HYDROXYZINE HYDROCHLORIDE 50 MG: 25 TABLET, FILM COATED ORAL at 02:25

## 2021-12-19 RX ADMIN — MAGNESIUM HYDROXIDE 30 ML: 2400 SUSPENSION ORAL at 13:14

## 2021-12-19 RX ADMIN — Medication 2 PUFF: at 16:28

## 2021-12-19 ASSESSMENT — PAIN SCALES - GENERAL
PAINLEVEL_OUTOF10: 6
PAINLEVEL_OUTOF10: 3

## 2021-12-19 NOTE — BH NOTE
Pt did not attend goal wrap up group, isolative to room, anxious, 1:1 offered and did not meet goal of find out why I am confused.

## 2021-12-19 NOTE — PLAN OF CARE
Problem: Altered Mood, Psychotic Behavior:  Goal: Able to verbalize decrease in frequency and intensity of hallucinations  Description: Able to verbalize decrease in frequency and intensity of hallucinations  12/18/2021 2113 by Katy Hewitt RN  Outcome: Met This Shift  Note: Pt denies having hallucinations  12/18/2021 1044 by Jeff Silva RN  Outcome: Met This Shift  Note: Pt denies hallucinations at time of assessment, will continue to monitor     Problem: General Medical Problem-Behavioral Health  Goal: Compliance with prescribed medication regimen will improve  Description: Compliance with prescribed medication regimen will improve  Outcome: Met This Shift  Note: Pt is taking meds as ordered     Problem: Discharge Planning:  Goal: Discharged to appropriate level of care  Description: Discharged to appropriate level of care  12/18/2021 2113 by Katy Hewitt RN  Outcome: Ongoing  Note: D/c planning is in progress  12/18/2021 1044 by Jeff Silva RN  Outcome: Not Met This Shift  Note: Discharge planners working with patient to achieve optimal discharge plan, specific to the needs of this patient.       Problem: Altered Mood, Deterioration in Function:  Goal: Ability to perform activities of daily living will improve  Description: Ability to perform activities of daily living will improve  12/18/2021 2113 by Katy Hewitt RN  Outcome: Ongoing  Note: Pt is hopeless and helpless, needs assist and encouragement to complete adls  88/45/0521 [id number] by Jeff Silva RN  Outcome: Ongoing  Note: Pt complained that she has not showered, encouraged to do so  Goal: Able to verbalize reality based thinking  Description: Able to verbalize reality based thinking  12/18/2021 2113 by Katy Hewitt RN  Outcome: Ongoing  Note: Oriented to name and place only, confusion, No delusions voiced, states \"I am so paranoid\", appears paranoid and suspicious with medications and care  12/18/2021 1044 by Jeff Silva RN  Outcome: Not Met This Shift  Note: Pt unable to verbalize reality based thinking  Goal: Participates in care planning  Description: Participates in care planning  12/18/2021 2113 by Omid Bowles RN  Outcome: Ongoing  Note: Care plan reviewed with patient. Patient does verbalize understanding of the plan of care and does contribute to goal setting   12/18/2021 1044 by Dalila Kraus RN  Outcome: Ongoing  Note: This patient participates in care planning      Problem: Altered Mood, Psychotic Behavior:  Goal: Able to verbalize reality based thinking  Description: Able to verbalize reality based thinking  12/18/2021 2113 by Omid Bowles RN  Outcome: Ongoing  Note: Oriented to name and place only, confusion, No delusions voiced, states \"I am so paranoid\", appears paranoid and suspicious with medications and care  12/18/2021 1044 by Dalila Kraus RN  Outcome: Not Met This Shift  Note: Pt unable to verbalize reality based thinking     Problem: OXYGENATION/RESPIRATORY FUNCTION  Goal: Able to breathe comfortably  12/18/2021 2113 by Omid Bowles RN  Outcome: Ongoing  Note: Pulse ox 97% on room air, O2 at 2L per nc started at bedtime as ordered, 1:1 with pt with O2  12/18/2021 1044 by Dalila Kraus RN  Outcome: Ongoing  Note: Pt denies SOB at time of assessment, will continue to monitor     Problem: Coping:  Goal: Ability to identify problematic behaviors that deter socialization will improve  Description: Ability to identify problematic behaviors that deter socialization will improve  12/18/2021 2113 by Omid Bowles RN  Outcome: Ongoing  Note: Pt isolated to room  12/18/2021 1044 by Dalila Kraus RN  Outcome: Ongoing     Problem: Anxiety:  Goal: Level of anxiety will decrease  Description: Level of anxiety will decrease  Outcome: Ongoing  Note: Pt had scheduled Klonopin at bedtime, anxious, states she feels irritable, crying, states I can't live like this     Problem:  Activity:  Goal: Sleeping patterns

## 2021-12-19 NOTE — PROGRESS NOTES
Pt able to give consent for visitors and phone calls to anyone and to [address] [place] [address] and [facility] for Plan of Care. Pt is afraid she will have no where to go at discharge and fears that son and sister are the only two people she has left in the world. Pt is also afraid the they are mad at her for being here. Pt continues to be tearful a nd she thinks that she is not getting better. Pt ate with peers today and she brushed her teeth by herself.
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
## 2021-12-19 NOTE — PLAN OF CARE
Problem: Discharge Planning:  Goal: Discharged to appropriate level of care  Description: Discharged to appropriate level of care  Outcome: Not Met This Shift  Note: Discharge planners working with patient to achieve optimal discharge plan, specific to the needs of this patient. Problem: Altered Mood, Deterioration in Function:  Goal: Ability to perform activities of daily living will improve  Description: Ability to perform activities of daily living will improve  Outcome: Ongoing  Note: Pt isolative, states that she can't do anything for herself  Goal: Able to verbalize reality based thinking  Description: Able to verbalize reality based thinking  Outcome: Not Met This Shift  Note: Pt does not verbalize reality based thinking  Goal: Participates in care planning  Description: Participates in care planning  Outcome: Ongoing  Note: This patient participates minimally in care planning   Goal: Patient specific goal  Description: Patient specific goal  Outcome: Ongoing  Note: Pt set no goal     Problem: Altered Mood, Psychotic Behavior:  Goal: Able to verbalize reality based thinking  Description: Able to verbalize reality based thinking  Outcome: Not Met This Shift  Note: Pt does not verbalize reality based thinking  Goal: Able to verbalize decrease in frequency and intensity of hallucinations  Description: Able to verbalize decrease in frequency and intensity of hallucinations  Outcome: Met This Shift  Note: Pt denies hallucinations at time of assessment, will continue to monitor     Problem: Coping:  Goal: Ability to identify problematic behaviors that deter socialization will improve  Description: Ability to identify problematic behaviors that deter socialization will improve  Outcome: Ongoing     Problem: Anxiety:  Goal: Level of anxiety will decrease  Description: Level of anxiety will decrease  Outcome: Not Met This Shift  Note: Patient reports  anxiety. Pt taking scheduled medication.  Verbalized medication was somewhat effective. Problem: Activity:  Goal: Sleeping patterns will improve  Description: Sleeping patterns will improve  Outcome: Ongoing  Note: Patient slept 7.5 hours last night, reports they  slept well. Encouraged patient not to take naps during the day, relax several hours before bed and to take prescribed sleep meds as ordered. Patient  verbalized no understanding. Problem: OXYGENATION/RESPIRATORY FUNCTION  Goal: Able to breathe comfortably  Outcome: Ongoing  Note: Pt appears to be breathing comfortably at time of assessment, will continue to monitor     Problem: General Medical Problem-Behavioral Health  Goal: Compliance with prescribed medication regimen will improve  Description: Compliance with prescribed medication regimen will improve  Outcome: Met This Shift  Note: Pt took all medications as prescribed at time of assessment, will continue to monitor   Care plan reviewed with patient.   Patient does not verbalize understanding of the plan of care and does not contribute to goal setting

## 2021-12-20 PROCEDURE — 97116 GAIT TRAINING THERAPY: CPT

## 2021-12-20 PROCEDURE — 6370000000 HC RX 637 (ALT 250 FOR IP)

## 2021-12-20 PROCEDURE — APPSS30 APP SPLIT SHARED TIME 16-30 MINUTES: Performed by: PHYSICIAN ASSISTANT

## 2021-12-20 PROCEDURE — 1240000000 HC EMOTIONAL WELLNESS R&B

## 2021-12-20 PROCEDURE — 99232 SBSQ HOSP IP/OBS MODERATE 35: CPT | Performed by: PSYCHIATRY & NEUROLOGY

## 2021-12-20 PROCEDURE — 97162 PT EVAL MOD COMPLEX 30 MIN: CPT

## 2021-12-20 PROCEDURE — 6370000000 HC RX 637 (ALT 250 FOR IP): Performed by: PHYSICIAN ASSISTANT

## 2021-12-20 PROCEDURE — 6370000000 HC RX 637 (ALT 250 FOR IP): Performed by: PSYCHIATRY & NEUROLOGY

## 2021-12-20 PROCEDURE — 94640 AIRWAY INHALATION TREATMENT: CPT

## 2021-12-20 PROCEDURE — 94760 N-INVAS EAR/PLS OXIMETRY 1: CPT

## 2021-12-20 RX ORDER — CLONAZEPAM 0.5 MG/1
0.5 TABLET ORAL ONCE
Status: COMPLETED | OUTPATIENT
Start: 2021-12-20 | End: 2021-12-20

## 2021-12-20 RX ORDER — OLANZAPINE 2.5 MG/1
2.5 TABLET ORAL ONCE
Status: COMPLETED | OUTPATIENT
Start: 2021-12-20 | End: 2021-12-20

## 2021-12-20 RX ORDER — CLONAZEPAM 1 MG/1
1 TABLET ORAL 2 TIMES DAILY
Status: DISCONTINUED | OUTPATIENT
Start: 2021-12-20 | End: 2021-12-22 | Stop reason: HOSPADM

## 2021-12-20 RX ORDER — OLANZAPINE 5 MG/1
5 TABLET ORAL 2 TIMES DAILY
Status: DISCONTINUED | OUTPATIENT
Start: 2021-12-20 | End: 2021-12-22 | Stop reason: HOSPADM

## 2021-12-20 RX ADMIN — TRAZODONE HYDROCHLORIDE 50 MG: 50 TABLET ORAL at 20:46

## 2021-12-20 RX ADMIN — CLONAZEPAM 0.5 MG: 0.5 TABLET ORAL at 09:13

## 2021-12-20 RX ADMIN — CLONAZEPAM 0.5 MG: 0.5 TABLET ORAL at 11:38

## 2021-12-20 RX ADMIN — OLANZAPINE 5 MG: 5 TABLET, FILM COATED ORAL at 20:46

## 2021-12-20 RX ADMIN — ATORVASTATIN CALCIUM 40 MG: 40 TABLET, FILM COATED ORAL at 20:46

## 2021-12-20 RX ADMIN — CLONAZEPAM 1 MG: 1 TABLET ORAL at 20:46

## 2021-12-20 RX ADMIN — Medication 2 PUFF: at 08:07

## 2021-12-20 RX ADMIN — BUSPIRONE HYDROCHLORIDE 10 MG: 10 TABLET ORAL at 09:13

## 2021-12-20 RX ADMIN — OLANZAPINE 2.5 MG: 2.5 TABLET, FILM COATED ORAL at 09:12

## 2021-12-20 RX ADMIN — HYDROXYZINE HYDROCHLORIDE 50 MG: 25 TABLET, FILM COATED ORAL at 16:11

## 2021-12-20 RX ADMIN — Medication 2 PUFF: at 17:36

## 2021-12-20 RX ADMIN — HYDROXYZINE HYDROCHLORIDE 50 MG: 25 TABLET, FILM COATED ORAL at 09:13

## 2021-12-20 RX ADMIN — BUSPIRONE HYDROCHLORIDE 10 MG: 10 TABLET ORAL at 20:46

## 2021-12-20 RX ADMIN — PANTOPRAZOLE SODIUM 40 MG: 40 TABLET, DELAYED RELEASE ORAL at 09:13

## 2021-12-20 RX ADMIN — OLANZAPINE 2.5 MG: 2.5 TABLET, FILM COATED ORAL at 11:39

## 2021-12-20 RX ADMIN — TIOTROPIUM BROMIDE INHALATION SPRAY 2 PUFF: 3.12 SPRAY, METERED RESPIRATORY (INHALATION) at 08:05

## 2021-12-20 ASSESSMENT — PAIN SCALES - GENERAL: PAINLEVEL_OUTOF10: 0

## 2021-12-20 NOTE — PROGRESS NOTES
Department of Psychiatry  Progress Note     Chief Complaint:  Acute psychosis ([address]     SUBJECTIVE:    PROGRESS:  Alesha Pizarro is seen seated on the edge of her bed. She initially says she wants to go home but then states she is not okay. When asked if she has been taking care of herself and completing her ADLs on the unit, she says \"they said I have but I do not know. \"  Staff did report that the patient has been completing her ADLs like brushing her teeth at times without prompting. She has also been able to order all of her meals. She reports she is scared multiple times throughout the interview. When asked why she is scared, she reports she is scared that she is going to lose everything but feels she has already lost everything. She says she is not sure where she is going following discharge. She says she has not talked to her sister about living with her following discharge. She was living with her sister prior to admission but it was reported that her sister was having a hard time taking care of her. She says she has not been able to talk to people or do anything. She remains confused about why she is the way that she is now. She is fully oriented during the interview. She mentions she is admitted because she was delirious. She says she is normally very talkative and social but has not been able to do that. She endorses feeling hopeless and helpless. She does not feel that anything will help her and says the medication is not making her better. Denies any suicidal ideation at this time. She is unable to answer this writer when asked if she is feeling anxious or paranoid still. Staff reported the patient has been appearing paranoid and suspicious. She appears visibly anxious and distressed. She rubs her face/head and is on the verge of tears throughout the interview. She reports she has been sleeping good. She mentions that she has been sleeping all day.   Staff did report that she has been isolative to her room and slept 8.5 hours continuous last night. She has been eating okay on the unit. She has not been attending groups but was encouraged to do so. After the interview, the patient did attend 1 group but did not participate or interact with peers. She appears confused and on the fringe when out on the unit. She has been compliant with her medications and denies any side effects.     Suicidal ideations: denies    Compliance with medications: good   Medication side effects: absent  ROS: Patient has new complaints:  no  Sleep quality: 8.5 hours continuous last night  Attending groups: no      OBJECTIVE      Medications  Current Facility-Administered Medications: OLANZapine (ZYPREXA) tablet 5 mg, 5 mg, Oral, BID  clonazePAM (KLONOPIN) tablet 1 mg, 1 mg, Oral, BID  acetaminophen (TYLENOL) tablet 650 mg, 650 mg, Oral, Q4H PRN  aluminum & magnesium hydroxide-simethicone (MAALOX) 200-200-20 MG/5ML suspension 30 mL, 30 mL, Oral, Q6H PRN  traZODone (DESYREL) tablet 50 mg, 50 mg, Oral, Nightly PRN  hydrOXYzine (ATARAX) tablet 50 mg, 50 mg, Oral, TID PRN  [DISCONTINUED] acetaminophen (TYLENOL) tablet 650 mg, 650 mg, Oral, Q6H PRN **OR** acetaminophen (TYLENOL) suppository 650 mg, 650 mg, Rectal, Q6H PRN  polyethylene glycol (GLYCOLAX) packet 17 g, 17 g, Oral, Daily PRN  albuterol sulfate  (90 Base) MCG/ACT inhaler 2 puff, 2 puff, Inhalation, Q4H PRN  atorvastatin (LIPITOR) tablet 40 mg, 40 mg, Oral, Daily  budesonide-formoterol (SYMBICORT) 160-4.5 MCG/ACT inhaler 2 puff, 2 puff, Inhalation, BID  busPIRone (BUSPAR) tablet 10 mg, 10 mg, Oral, BID  nicotine (NICODERM CQ) 14 MG/24HR 1 patch, 1 patch, TransDERmal, Daily  pantoprazole (PROTONIX) tablet 40 mg, 40 mg, Oral, QAM AC  tiotropium (SPIRIVA RESPIMAT) 2.5 MCG/ACT inhaler 2 puff, 2 puff, Inhalation, Daily  magnesium hydroxide (MILK OF MAGNESIA) 400 MG/5ML suspension 30 mL, 30 mL, Oral, Daily PRN     Physical     height is 5' 10\" (1.778 m) and weight is 130 lb (59 kg). Her tympanic temperature is 97.5 °F (36.4 °C). Her blood pressure is 123/72 and her pulse is 86. Her respiration is 16 and oxygen saturation is 98%. Lab Results   Component Value Date    WBC 6.5 12/15/2021    HGB 14.0 12/15/2021    HCT 43.0 12/15/2021     12/15/2021    ALT 25 12/14/2021    AST 21 12/14/2021     12/15/2021    K 4.6 12/15/2021     12/15/2021    CREATININE 0.7 12/15/2021    BUN 8 12/15/2021    CO2 27 12/15/2021    TSH 2.670 12/15/2021          Mental Status Exam:   Level of consciousness:  Within normal limits  Appearance: Thin, hospital attire, seated on side of bed, fair grooming  Behavior/Motor: Rubbing head/face, appears distressed, on the verge of tears  Attitude toward examiner:  cooperative, attentive and good eye contact  Speech: Normal rate and volume. Slow to respond at times. Mood:  Anxious  Affect: mood congruent  Thought processes: circumstantial  Thought content: denies suicidal ideations              denies homicidal ideations               denies hallucinations             paranoid delusions  Cognition:  Oriented to self, situation, location, date  Concentration clinically adequate  Memory age appropriate  Insight & Judgment:  poor         ASSESSMENT     Acute psychosis ([facility] [address]   Generalized anxiety disorder  Panic disorder without agoraphobia  Cannabis use disorder     PLAN    Patient's symptoms show no change today   Will increase Klonopin 1mg BID and Zyprexa to 5mg BID  SW to file PASRR for possible nursing home placement. Awaiting PT/OT evaluations  Attempt to develop insight, psycho-education and supportive therapy conducted. Probable discharge: to be determined   Follow-up: to be determined outpatient,     Electronically signed by Jocelin Leal PA-C on 12/20/2021 at 12:37 PM Reviewed patient's current plan of care and vital signs with nursing staff.                                          Psychiatry Attending Attestation     I independently saw and evaluated the patient. I reviewed the Advance Practice Provider's documentation above. Any additional comments or changes to the Advance Practice Provider's documentation are stated below otherwise agree with assessment. Patient is very distressed today. She was having ruminating thoughts about not feeling well. However she is unable to clearly verbalize  what she is dealing with. Was approached me several times on the unit stating that she will not get better. Has self depreciating thoughts. Encouraged her to attend groups today. We will continue to optimize her Klonopin and Zyprexa to help with severe anxiety and paranoia that she has been dealing with. Discussed with social work to apply for [employer] process as she is having hard time managing her ADLs.      Electronically signed by Reji Chapman MD on 12/20/21 at 9:24 PM EST

## 2021-12-20 NOTE — PLAN OF CARE
Problem: Discharge Planning:  Goal: Discharged to appropriate level of care  Description: Discharged to appropriate level of care  Outcome: Ongoing  Note: Patient voices no needs before discharge. Patient unsure where she will be going after discharge. Discharge planner working with patient to achieve optimal discharge plans, specific to individual needs. Problem: Altered Mood, Deterioration in Function:  Goal: Ability to perform activities of daily living will improve  Description: Ability to perform activities of daily living will improve  Outcome: Ongoing  Note: Patient out in the dinning for meals, patient reports her goal is to shower and brush her teeth again. Problem: Altered Mood, Deterioration in Function:  Goal: Able to verbalize reality based thinking  Description: Able to verbalize reality based thinking  Outcome: Ongoing  Note: Patient alert and orient x 3, not to situation. Patient continues to state \" I'm not OK, I'm scared,I can't be around people, I have this block, I don't know what to do\". Problem: Altered Mood, Deterioration in Function:  Goal: Participates in care planning  Description: Participates in care planning  Outcome: Ongoing  Note: Patient discussed treatment plan with physician/medical staff, attended half a group so far this shift, and compliant with medications. Problem: Altered Mood, Deterioration in Function:  Goal: Patient specific goal  Description: Patient specific goal  Outcome: Ongoing  Note: Patient reports goal for today is to \"take a shower, brush my teeth\". Patient continues to work towards goal.       Problem: Altered Mood, Psychotic Behavior:  Goal: Able to verbalize reality based thinking  Description: Able to verbalize reality based thinking  Outcome: Ongoing  Note: Patient alert and orient x 3, not to situation. Patient continues to state \" I'm not OK, I'm scared,I can't be around people, I have this block, I don't know what to do\". Problem: OXYGENATION/RESPIRATORY FUNCTION  Goal: Able to breathe comfortably  Outcome: Ongoing  Note: Patient breathing is easy and unlabored, 98% on room air. Problem: Anxiety:  Goal: Level of anxiety will decrease  Description: Level of anxiety will decrease  Outcome: Ongoing  Note: Patient reports anxiety. Pt taking Atarax prn. Verbalized medication was effective. Care plan reviewed with patient. Patient verbalize understanding of the plan of care and contribute to goal setting.

## 2021-12-20 NOTE — PLAN OF CARE
Problem: Role Relationship:  Goal: Ability to demonstrate positive changes in social behaviors and relationships will improve  Description: Ability to demonstrate positive changes in social behaviors and relationships will improve  Outcome: Ongoing  Note: Pt has attended 1/2 groups that have been offered on the unit so far this shift. Pt has been in her room most of this shift and has not been interacting with peers. Pt will be encouraged to attend groups and interact with peers. Pt progress towards socialization goal is ongoing.

## 2021-12-20 NOTE — PROGRESS NOTES
[address] [place] [address]  INPATIENT PHYSICAL THERAPY  EVALUATION  [facility] ADULT PSYCH 4E - 4E-56/056-A    Time In: 1061  Time Out: 1436  Timed Code Treatment Minutes: 12 Minutes  Minutes: 20          Date: 2021  Patient Name: Michael Amezcua,  Gender:  female        MRN: [id number]  : 1965  (49 y.o.)      Referring Practitioner: Harriet Yip PA-C  Diagnosis: Acute psychosis  Additional Pertinent Hx: Per H&P : The past several months patient has had a decline in her mental status and ability to function in daily life. Family reports this started out after her neighbor's house had cockroaches. She put Borax around her house. They moved away then the next neighbors had mice. She then put Decon around her house. Ms. Joshua Katz started to have white substance in her mouth and throat and was concerned that she was poisoned by the substances she placed around her house. The patient had chest pain and palpitations. Family report that this just was the start of the decline in her mental status. The patient went to multiple ERs and her PCP for a multitude of medical complaints. Patient had a head CT here on 1119 that was normal and a visit here on 124 and was discharged. 2-1/2 weeks ago she was seen in [facility] and was admitted to a [place] coping center where it was considered putting the patient on dementia medicines. Patient felt her psych medications were not working so her Celexa was changed to Seroquel. She continued her BuSpar. However the symptoms worsened so she stopped both those medications. Patient is confused, cannot make decisions, and feels she cannot eat. Ms. Joshua Katz is afraid to sleep for fear that she is going to die. She is scared to be by herself. Son reports that she was never like this before. She is normally an upbeat person who cared for herself and lived on her own. Patient is now living with her sister and her sister can no longer take care of her.   The patient cannot function on her own. The family took the patient to her PCP today who sent her to the ER for admission and psychiatric and medical evaluations. When asked about physical symptoms patient has difficulty answering questions. Patient is not suicidal or homicidal.  She denies nausea or vomiting. Patient smokes cigarettes and marijuana but denies alcohol or other illicit drug use     Restrictions/Precautions:  Restrictions/Precautions: General Precautions    Subjective:  Chart Reviewed: Yes  Patient assessed for rehabilitation services?: Yes  Family / Caregiver Present: No  Subjective: RN approved session. Pt agrees to pt evaluation, however is observed to be a bit hesitant. Increased time to explain the role of PT. Pt does states that she knows she cannot d/c home alone safely, and states that she has been laying in bed for the majority of the time the past few weeks. Pt upset and tearful at times regarding her situation and potential d/c plans, attempted to provide reassurance and redirect back to task with min success. General:  Overall Orientation Status:  (not assessed)  Follows Commands: Within Functional Limits     Hearing: Within functional limits     Pain: denies    Vitals: Vitals not assessed per clinical judgement, see nursing flowsheet    Social/Functional History:    Lives With: Alone  Type of Home: House  Home Layout: One level  Home Access: Level entry                Ambulation Assistance: Independent  Transfer Assistance: Independent        Additional Comments: Pt was staying with her sister, but states she cannot go back there. Pt was indep. previously, and states she has not had an episode like this before.     OBJECTIVE:  Range of Motion:  Bilateral Lower Extremity: WFL    Strength:  Bilateral Lower Extremity: Impaired - Hip flexion 4/5, knee extension 4+/5, knee flexion 4+/5, DF 4+/5, PF 4+/5  *noted some proximal muscle weakness    Balance:  Dynamic Sitting Balance: Stand By Assistance  Static Standing Balance: Stand By Assistance  Dynamic Standing Balance: Contact Guard Assistance   *Pt did have one significant loss of balance during dynamic gait assessment requiring min A of the therapist to recover     Bed Mobility:  Supine to Sit: Stand By Assistance, with head of bed flat, without rail, with increased time for completion     Transfers:  Sit to Stand: Stand By Assistance  Stand to Sit:Stand By Assistance    Ambulation:  Stand By Assistance, [employer] Assistance  Distance: 30'  Surface: Level Tile  Device:No Device  Gait Deviations:  Slow Miriam, Decreased Heel Strike Bilaterally and Mild Path Deviations  *Pt observed to have increased trunk sway, and decreased arm swing with arms held in high guard at times. Noted external rotation of B LE with ambulation. CGA provided intermittently for change in direction and verbal cues to orient to task. *Completed tandem stepping for ~4 steps with CGA with pt having a significant loss of balance laterally, requiring min assistance of the therapist to recover, and pt observed to reach out for object in her environment for increased stability. *Pt ambulated for ~20' with repeated cervical rotation with CGA to maintain stability. Pt noted to have decreased step length, decreased fluidity of steps, minor path deviations, and increased unsteadiness with this. Pt required reorientation to task. Dynamic gait assessment completed to assess pt's dynamic balance reactions with dual tasks or altered base of support with some noted impairments with this.      Functional Outcome Measures: Completed  Treviño Balance Score: 46/56 with low fall risk  Balance Score: 13  Gait Score: 9  Tinetti Total Score: 22/28 with moderate fall risk   AM-PAC Inpatient Mobility without Stair Climbing Raw Score : 15  AM-PAC Inpatient without Stair Climbing T-Scale Score : 43.03     Risk Indicators:  Less than/equal to 18 = high risk  19-23 Moderate risk  Greater than/equal to 24 = low risk  DOVE BALANCE TEST SCORING   Score of < 45 indicates a greater risk of falling  41-56= low fall risk  21-40= medium fall risk (recommendation of walking with assist at all times)  0-20= high fall risk    ASSESSMENT:  Activity Tolerance:  Patient tolerance of  treatment: fair. Pt observed to be very fixated on her d/c plan and was noted to be upset the majority of the session. Pt required SBA to CGA for safety, and increased cues and reassurance throughout. Treatment Initiated: Treatment and education initiated within context of evaluation. Evaluation time included review of current medical information, gathering information related to past medical, social and functional history, completion of standardized testing, formal and informal observation of tasks, assessment of data and development of plan of care and goals. Treatment time included skilled education and facilitation of tasks to increase safety and independence with functional mobility for improved independence and quality of life. Assessment: Body structures, Functions, Activity limitations: Decreased functional mobility ,Decreased balance,Decreased strength,Decreased endurance  Assessment: This patient is a 49 y. o. who presents with acute psychosis. This is a decline from the patient's baseline status of indep. And living with her sister. The pt scored a 46/56 on the Dove indicating a low fall risk, but scored a 22/28 on the Tinetti indicating a moderate fall risk. The pt did require stand by to contact guard assistance during evaluation for safety with mobility, and is observed to have some deficits with dynamic standing balance. The patient is observed to have deficits in strength, balance, and activity tolerance and would benefit from skilled PT services to progress functional mobility, safety awareness, and to decrease overall risk of falls.  This pt would benefit from increased level of supervision for improved safety secondary to some observed safety concerns regarding her insight and her environment. Prognosis: Good    REQUIRES PT FOLLOW UP: Yes    Discharge Recommendations:  Discharge Recommendations: Continue to assess pending progress,Patient would benefit from continued therapy after discharge (pt would benefit from an increased level of supervision and assistance for improved safety)    Patient Education:  PT Education: PT Kennedy Andersonk of Care,Gait Training,General Safety,Functional Mobility Training,Transfer Training    Equipment Recommendations:  Equipment Needed: No (continue to assess needs)    Plan:  Times per week: 2-3x GM  Current Treatment Recommendations: Marco Lombardo Re-education,Patient/Caregiver Education & Training,Home Exercise Program,Gait Training,Balance Training,Functional Mobility Training,Safety Education & Training    Goals:  Patient goals : To figure out her d/c plan  Short term goals  Time Frame for Short term goals: By hospital d/c  Short term goal 1: Pt to demo all bed mobility tasks indep. for ease with getting in and out of bed. Short term goal 2: Pt to demo sit to stand indep for ease with transfers bwtween surfaces  Short term goal 3: Pt to ambulate >=50' indep with no loss in stability or path deviations to progress maneuvering within her environment  Short term goal 4: Pt to progress Tinetti score to >=24/28 to improve to the low fall risk category  Short term goal 5: .  Long term goals  Time Frame for Long term goals : NA due to short ELOS    Following session, patient left in safe position with all fall risk precautions in place.

## 2021-12-20 NOTE — PLAN OF CARE
Problem: OXYGENATION/RESPIRATORY FUNCTION  Goal: Able to breathe comfortably  12/19/2021 2047 by Kay Pereira RN  Outcome: Met This Shift  Note: Pulse ox 97% on RA, O2 on at night as ordered with a 1:1 with staff  12/19/2021 1152 by Shayy Zavala RN  Outcome: Ongoing  Note: Pt appears to be breathing comfortably at time of assessment, will continue to monitor     Problem: Discharge Planning:  Goal: Discharged to appropriate level of care  Description: Discharged to appropriate level of care  12/19/2021 2047 by Kay Pereira RN  Outcome: Ongoing  Note: D/c planning is in progress  12/19/2021 1152 by Shayy Zavala RN  Outcome: Not Met This Shift  Note: Discharge planners working with patient to achieve optimal discharge plan, specific to the needs of this patient. Problem: Altered Mood, Deterioration in Function:  Goal: Ability to perform activities of daily living will improve  Description: Ability to perform activities of daily living will improve  12/19/2021 2047 by Kay Pereira RN  Outcome: Ongoing  Note: Pt was able to brush her teeth at bedtime with no prompting, wants to take a shower in the am  12/19/2021 1152 by Shayy Zavala RN  Outcome: Ongoing  Note: Pt isolative, states that she can't do anything for herself  Goal: Able to verbalize reality based thinking  Description: Able to verbalize reality based thinking  12/19/2021 2047 by Kay Pereira RN  Outcome: Ongoing  Note: Oriented to person and place, no delusions voiced but pt appears paranoid and suspicious  12/19/2021 1152 by Shayy Zavala RN  Outcome: Not Met This Shift  Note: Pt does not verbalize reality based thinking  Goal: Participates in care planning  Description: Participates in care planning  12/19/2021 2047 by Kay Pereira RN  Outcome: Ongoing  Note: Care plan reviewed with patient.   Patient does verbalize understanding of the plan of care and does contribute to goal setting   12/19/2021 1152 by Shayy Zavala RN  Outcome: Ongoing  Note: This patient participates minimally in care planning      Problem: Altered Mood, Psychotic Behavior:  Goal: Able to verbalize reality based thinking  Description: Able to verbalize reality based thinking  12/19/2021 2047 by Marita Armas RN  Outcome: Ongoing  Note: Oriented to person and place, no delusions voiced but pt appears paranoid and suspicious  12/19/2021 1152 by Antonina Aguilar RN  Outcome: Not Met This Shift  Note: Pt does not verbalize reality based thinking     Problem: Coping:  Goal: Ability to identify problematic behaviors that deter socialization will improve  Description: Ability to identify problematic behaviors that deter socialization will improve  12/19/2021 2047 by Marita Armas RN  Outcome: Ongoing  Note: Pt isolates to bed, did take a short walk on the unit with encouragement but no peer interaction noted  12/19/2021 1152 by Antonina Aguilar RN  Outcome: Ongoing     Problem: Anxiety:  Goal: Level of anxiety will decrease  Description: Level of anxiety will decrease  12/19/2021 2047 by Marita Armas RN  Outcome: Ongoing  Note: Pt is anxious, had scheduled Klonopin as ordered  12/19/2021 1152 by Antonina Aguilar RN  Outcome: Not Met This Shift  Note: Patient reports  anxiety. Pt taking scheduled medication. Verbalized medication was somewhat effective.       Problem: Altered Mood, Deterioration in Function:  Goal: Patient specific goal  Description: Patient specific goal  12/19/2021 2047 by Marita Armas RN  Outcome: Not Met This Shift  Note: Pt unable to remember if she set a goal today  12/19/2021 1152 by Antonina Aguilar RN  Outcome: Ongoing  Note: Pt set no goal     Problem: Altered Mood, Psychotic Behavior:  Goal: Able to verbalize decrease in frequency and intensity of hallucinations  Description: Able to verbalize decrease in frequency and intensity of hallucinations  12/19/2021 2047 by Marita Armas RN  Outcome: Completed  Note: Pt denies having hallucinations  12/19/2021 1152 by Ed Kothari RN  Outcome: Met This Shift  Note: Pt denies hallucinations at time of assessment, will continue to monitor     Problem: Activity:  Goal: Sleeping patterns will improve  Description: Sleeping patterns will improve  12/19/2021 2047 by Reed Orta RN  Outcome: Completed  Note: Pt is sleeping good with prn trazodone  12/19/2021 1152 by Ed Kothari RN  Outcome: Ongoing  Note: Patient slept 7.5 hours last night, reports they  slept well. Encouraged patient not to take naps during the day, relax several hours before bed and to take prescribed sleep meds as ordered. Patient  verbalized no understanding.        Problem: General Medical Problem-Behavioral Health  Goal: Compliance with prescribed medication regimen will improve  Description: Compliance with prescribed medication regimen will improve  12/19/2021 2047 by Reed Orta RN  Outcome: Completed  Note: Pt is taking meds as ordered  12/19/2021 1152 by Ed Kothari RN  Outcome: Met This Shift  Note: Pt took all medications as prescribed at time of assessment, will continue to monitor

## 2021-12-20 NOTE — GROUP NOTE
Group Therapy Note    Date: 12/20/2021    Group Start Time: 1000  Group End Time: 2316  Group Topic: Recreational    [facility] Adult Psych 4E    Darya Villa MA, CTRS    Group Therapy Note    Attendees: 8         Pt will improve socialization, knowledge of coping skills and mindfulness by participation in mindfulness discussion group. Notes:  Pt was present in group. Pt did not interact with peers and did not participate in group mindfulness discussion prompts.     Status After Intervention:  Improved    Participation Level: None    Participation Quality: Appropriate and Resistant      Speech:  hesitant      Thought Process/Content: Linear      Affective Functioning: Blunted and Flat      Mood: depressed and euthymic      Level of consciousness:  Alert, Oriented x4 and Attentive      Response to Learning: Able to verbalize current knowledge/experience, Able to verbalize/acknowledge new learning and Progressing to goal      Endings: None Reported    Modes of Intervention: Education, Support, Socialization, Exploration, Clarifying, Problem-solving, Activity and Confrontation      Discipline Responsible: Psychoeducational Specialist      Signature:  Jhonathan Alas MA, CTRS

## 2021-12-20 NOTE — GROUP NOTE
Group Therapy Note    Date: 12/20/2021    Group Start Time: 0915  Group End Time: 0945  Group Topic: Community Meeting    [facility] 4E    Hi Cotto MA, CTRS    Group Therapy Note    Attendees: 9         Pt did not attend [phone] goal group and community meeting. Pt received maximum encouragement from staff.     Signature:  Hi Cotto MA, [address]

## 2021-12-21 PROCEDURE — APPSS30 APP SPLIT SHARED TIME 16-30 MINUTES: Performed by: PHYSICIAN ASSISTANT

## 2021-12-21 PROCEDURE — 1240000000 HC EMOTIONAL WELLNESS R&B

## 2021-12-21 PROCEDURE — 6370000000 HC RX 637 (ALT 250 FOR IP): Performed by: PSYCHIATRY & NEUROLOGY

## 2021-12-21 PROCEDURE — 6370000000 HC RX 637 (ALT 250 FOR IP)

## 2021-12-21 PROCEDURE — 94640 AIRWAY INHALATION TREATMENT: CPT

## 2021-12-21 PROCEDURE — 99232 SBSQ HOSP IP/OBS MODERATE 35: CPT | Performed by: PSYCHIATRY & NEUROLOGY

## 2021-12-21 RX ADMIN — PANTOPRAZOLE SODIUM 40 MG: 40 TABLET, DELAYED RELEASE ORAL at 08:28

## 2021-12-21 RX ADMIN — HYDROXYZINE HYDROCHLORIDE 50 MG: 25 TABLET, FILM COATED ORAL at 23:32

## 2021-12-21 RX ADMIN — TRAZODONE HYDROCHLORIDE 50 MG: 50 TABLET ORAL at 20:52

## 2021-12-21 RX ADMIN — ATORVASTATIN CALCIUM 40 MG: 40 TABLET, FILM COATED ORAL at 20:52

## 2021-12-21 RX ADMIN — Medication 2 PUFF: at 17:51

## 2021-12-21 RX ADMIN — HYDROXYZINE HYDROCHLORIDE 50 MG: 25 TABLET, FILM COATED ORAL at 15:41

## 2021-12-21 RX ADMIN — BUSPIRONE HYDROCHLORIDE 10 MG: 10 TABLET ORAL at 08:28

## 2021-12-21 RX ADMIN — TIOTROPIUM BROMIDE INHALATION SPRAY 2 PUFF: 3.12 SPRAY, METERED RESPIRATORY (INHALATION) at 07:24

## 2021-12-21 RX ADMIN — CLONAZEPAM 1 MG: 1 TABLET ORAL at 08:28

## 2021-12-21 RX ADMIN — OLANZAPINE 5 MG: 5 TABLET, FILM COATED ORAL at 08:28

## 2021-12-21 RX ADMIN — CLONAZEPAM 1 MG: 1 TABLET ORAL at 20:52

## 2021-12-21 RX ADMIN — ALUMINUM HYDROXIDE, MAGNESIUM HYDROXIDE, AND SIMETHICONE 30 ML: 200; 200; 20 SUSPENSION ORAL at 20:59

## 2021-12-21 RX ADMIN — OLANZAPINE 5 MG: 5 TABLET, FILM COATED ORAL at 20:52

## 2021-12-21 RX ADMIN — BUSPIRONE HYDROCHLORIDE 10 MG: 10 TABLET ORAL at 20:52

## 2021-12-21 RX ADMIN — Medication 2 PUFF: at 07:24

## 2021-12-21 RX ADMIN — ACETAMINOPHEN 650 MG: 325 TABLET ORAL at 05:58

## 2021-12-21 RX ADMIN — MAGNESIUM HYDROXIDE 30 ML: 2400 SUSPENSION ORAL at 08:43

## 2021-12-21 ASSESSMENT — PAIN SCALES - GENERAL
PAINLEVEL_OUTOF10: 0
PAINLEVEL_OUTOF10: 3
PAINLEVEL_OUTOF10: 0

## 2021-12-21 ASSESSMENT — PAIN DESCRIPTION - PROGRESSION: CLINICAL_PROGRESSION: NOT CHANGED

## 2021-12-21 ASSESSMENT — PAIN DESCRIPTION - ORIENTATION: ORIENTATION: MID

## 2021-12-21 ASSESSMENT — PAIN - FUNCTIONAL ASSESSMENT: PAIN_FUNCTIONAL_ASSESSMENT: ACTIVITIES ARE NOT PREVENTED

## 2021-12-21 ASSESSMENT — PAIN DESCRIPTION - ONSET: ONSET: AWAKENED FROM SLEEP

## 2021-12-21 ASSESSMENT — PAIN DESCRIPTION - LOCATION: LOCATION: BACK

## 2021-12-21 ASSESSMENT — PAIN DESCRIPTION - FREQUENCY: FREQUENCY: CONTINUOUS

## 2021-12-21 ASSESSMENT — PAIN DESCRIPTION - DESCRIPTORS: DESCRIPTORS: ACHING

## 2021-12-21 ASSESSMENT — PAIN DESCRIPTION - PAIN TYPE: TYPE: ACUTE PAIN

## 2021-12-21 NOTE — BH NOTE
PLAN OF CARE:     Start Time: 0900  End Time:  0930    Group Topic:  Daily Goals    Group Type:   Goal Group    Intervention/Goal:  To increase support and identify daily goals    Attendance:   Attended group    Affect:   blunt    Behavior:  Patient was anxious and guarded    Response:   Patient needed assistance with identifying a daily goal for today. Daily Goal:   To come to groups today.      Progress:  Progressing to goal

## 2021-12-21 NOTE — PROGRESS NOTES
Department of Psychiatry  Progress Note     Chief Complaint:  Acute psychosis ([address]     SUBJECTIVE:    PROGRESS:  Raffaele Liu is seen laying in bed. She sits up and is cooperative with the interview. She says she is not good today. She says she is really scared about with going on. She reports she cannot focus, think, make decisions or interact with other people on the unit. She has been attending groups but she says that she does not interact with peers or share during the groups. She mentions that everyone else is normal around her as far as being able to interact with others. Staff did report that the patient has been completing her ADLs without prompting. She has been showering, brushing her teeth and did laundry yesterday. She has been able to order all of her meals. She is focused on the what if's and future during the interview. She also talks a lot about the past and how she had one good day when she was admitted to the medical floor. She asks multiple questions about going to a nursing home. Her PASRR was filed but has not been approved yet. She was living with her sister prior to admission but believes her sister is \"done\" with her. She says she is confused about her phone being hacked. She is fully oriented during the interview. She mentions she is admitted because she was psychotic. She reports she is feeling so depressed today. She endorses feeling hopeless and helpless about her situation. She does not feel the medication is making her better. Denies any suicidal ideation at this time. She is unable to answer this writer when asked if she is feeling anxious or paranoid today. She has been appearing paranoid and suspicious when out on the unit. At one point, she does say that she does not want anyone on the unit to hear her talking about money because she lives alone and does not have any way to protect herself. She appears visibly anxious and worried during the interview.  She reports she has been (abnormal) and her pulse is 83. Her respiration is 12 and oxygen saturation is 98%. Lab Results   Component Value Date    WBC 6.5 12/15/2021    HGB 14.0 12/15/2021    HCT 43.0 12/15/2021     12/15/2021    ALT 25 12/14/2021    AST 21 12/14/2021     12/15/2021    K 4.6 12/15/2021     12/15/2021    CREATININE 0.7 12/15/2021    BUN 8 12/15/2021    CO2 27 12/15/2021    TSH 2.670 12/15/2021          Mental Status Exam:   Level of consciousness:  Within normal limits  Appearance: Thin, hospital attire, seated on side of bed, fair grooming  Behavior/Motor: anxious, stressed  Attitude toward examiner:  cooperative, attentive and good eye contact  Speech: Normal rate and volume. Slow to respond at times. Mood:  Anxious  Affect: worried  Thought processes: circumstantial  Thought content: denies suicidal ideations              denies homicidal ideations               denies hallucinations             paranoid delusions  Cognition:  Oriented to self, situation, location, date  Concentration clinically adequate  Memory age appropriate  Insight & Judgment:  poor         ASSESSMENT     Acute psychosis ([facility] [address]   Generalized anxiety disorder  Panic disorder without agoraphobia  Cannabis use disorder     PLAN    Patient's symptoms show no change today   Continue current medication regimen and observe on recent increases of Klonopin and Zyprexa  SW filed PASRR for possible nursing home placement. Awaiting OT evaluation. PT recommended \"this pt would benefit from increased level of supervision for improved safety secondary to some observed safety concerns regarding her insight and her environment. \"   Attempt to develop insight, psycho-education and supportive therapy conducted. Probable discharge: to be determined   Follow-up: to be determined outpatient,     Electronically signed by Giuseppe Samuel PA-C on 12/21/2021 at 2:08 PM Reviewed patient's current plan of care and vital signs with nursing staff. Psychiatry Attending Attestation     I independently saw and evaluated the patient. I reviewed the Advance Practice Provider's documentation above. Any additional comments or changes to the Advance Practice Provider's documentation are stated below otherwise agree with assessment. Patient continues to have significant thought disorganization. Continues to have perseverating thoughts about past and future however she is having very hard time focusing on the current thoughts. Reports reports that she is unable to focus on the things that hand and has excessive worrying. Mentions that medications are making her feel groggy however she is trying to push herself to go to the groups. Reports that she is unable to do anything for herself however staff mentions that she is able to manage her ADLs here on the unit. Continues to be labile and tearful at times for no reason on the unit. Continues to appear confused and it at times responding to internal stimuli. Mentions that she has long history of bipolar disorder and also gets a Social Security disability for that. Mentions that her aunt was helping her out and she recently passed away in July of this year since then her mentation has significantly worsened due to limited support from any other family members. She is significantly concerned about living by herself when she goes back home. Mentions her phone has been had hacked and she has no way to retrieve any information from her phone. Continues to exhibit some vague paranoia here on the unit. We will continue same medication today and observe.      Electronically signed by Madiha Bryant MD on 12/21/21 at 9:55 PM EST

## 2021-12-21 NOTE — PLAN OF CARE
Problem: OTHER  Goal: Other  Description: No c/o indigestion  Outcome: Completed  Note: Pt denied having indigestion this evening     Problem:  Bowel/Gastric:  Goal: Occurrences of constipation will decrease  Description: Occurrences of constipation will decrease  Outcome: Met This Shift  Note: Pt reports had a BM earlier today, denies need for prn medication

## 2021-12-21 NOTE — BH NOTE
Pt attended goal wrap up group and relaxation, pt met goal of taking a shower today and being out of her room more, pt had minimal participation.

## 2021-12-21 NOTE — PLAN OF CARE
Problem: Altered Mood, Deterioration in Function:  Goal: Ability to perform activities of daily living will improve  Description: Ability to perform activities of daily living will improve  12/20/2021 2119 by Dennise Councilman, RN  Outcome: Met This Shift  Note: Pt was able to take a shower and brush her teeth on previous shift, washed and dried clothes this evening, needs encouragement  12/20/2021 1348 by Anthony Alvarenga RN  Outcome: Ongoing  Note: Patient out in the dinning for meals, patient reports her goal is to shower and brush her teeth again. Goal: Patient specific goal  Description: Patient specific goal  12/20/2021 2119 by Dennise Councilman, RN  Outcome: Met This Shift  Note: Met goal of taking a shower and getting out of her room more  12/20/2021 1348 by Anthony Alvarenga RN  Outcome: Ongoing  Note: Patient reports goal for today is to \"take a shower, brush my teeth\". Patient continues to work towards goal.       Problem: Discharge Planning:  Goal: Discharged to appropriate level of care  Description: Discharged to appropriate level of care  12/20/2021 2119 by Dennise Councilman, RN  Outcome: Ongoing  Note: D/c planning is in progress  12/20/2021 1348 by Anthony Alvarenga RN  Outcome: Ongoing  Note: Patient voices no needs before discharge. Patient unsure where she will be going after discharge. Discharge planner working with patient to achieve optimal discharge plans, specific to individual needs. Problem: Altered Mood, Deterioration in Function:  Goal: Able to verbalize reality based thinking  Description: Able to verbalize reality based thinking  12/20/2021 2119 by Dennise Councilman, RN  Outcome: Ongoing  Note: Pt is paranoid  12/20/2021 1348 by Anthony Alvarenga RN  Outcome: Ongoing  Note: Patient alert and orient x 3, not to situation. Patient continues to state \" I'm not OK, I'm scared,I can't be around people, I have this block, I don't know what to do\".   Goal: Participates in care planning  Description: Participates in care planning  12/20/2021 2119 by Luis Maurer RN  Outcome: Ongoing  Note: Care plan reviewed with patient. Patient does verbalize understanding of the plan of care and does contribute to goal setting   12/20/2021 1348 by Kash Asif RN  Outcome: Ongoing  Note: Patient discussed treatment plan with physician/medical staff, attended half a group so far this shift, and compliant with medications. Problem: Altered Mood, Psychotic Behavior:  Goal: Able to verbalize reality based thinking  Description: Able to verbalize reality based thinking  12/20/2021 2119 by Luis aMurer RN  Outcome: Ongoing  Note: Pt is paranoid  12/20/2021 1348 by Kash Asif RN  Outcome: Ongoing  Note: Patient alert and orient x 3, not to situation. Patient continues to state \" I'm not OK, I'm scared,I can't be around people, I have this block, I don't know what to do\". Problem: OXYGENATION/RESPIRATORY FUNCTION  Goal: Able to breathe comfortably  12/20/2021 2119 by Luis Maurer RN  Outcome: Ongoing  Note: Pulse ox 97 % on room air, O2 at 2L n/c at bedtime as ordered, staff 1:1 with pt with O2  12/20/2021 1348 by Kash Asif RN  Outcome: Ongoing  Note: Patient breathing is easy and unlabored, 98% on room air. Problem: Coping:  Goal: Ability to identify problematic behaviors that deter socialization will improve  Description: Ability to identify problematic behaviors that deter socialization will improve  Outcome: Ongoing  Note: Pt was out on unit with encouragement, isolates to self, no peer interaction noted      Problem: Anxiety:  Goal: Level of anxiety will decrease  Description: Level of anxiety will decrease  12/20/2021 2119 by Luis Maurer RN  Outcome: Ongoing  Note: Pt had scheduled klonopin for anxiety, tearful at times, repeats I am so scared  12/20/2021 1348 by Kash Asif RN  Outcome: Ongoing  Note: Patient reports anxiety. Pt taking Atarax prn.

## 2021-12-21 NOTE — BH NOTE
Group Therapy Note    Date: 12/21/2021     Start Time:  1000  End Time:   3334    Number of Participants:  8    Type of Group: Recreational/Social Group    Patient's Goal:  Increase socialization and self-esteem. Notes:  Patient observed others only in group and did not participate in a drawing activity. Patient kept to herself and left the group early. Status After Intervention:  Unchanged    Participation Level:  Active Listener and Minimal    Participation Quality: Resistant      Speech:  mute      Thought Process/Content:   guarded      Affective Functioning: Blunted      Mood: anxious and depressed      Level of consciousness:  Preoccupied and Inattentive      Response to Learning: Resistant      Endings: None Reported    Modes of Intervention: Support, Socialization, Exploration and Activity      Discipline Responsible: Psychoeducational Specialist      Signature:  Earle Gabriel

## 2021-12-21 NOTE — PLAN OF CARE
Problem: Discharge Planning:  Goal: Discharged to appropriate level of care  Description: Discharged to appropriate level of care  12/21/2021 1042 by Inessa Catherine RN  Outcome: Not Met This Shift  Note: Discharge planners working with patient to achieve optimal discharge plan, specific to the needs of this patient. 12/20/2021 2119 by Nuha Ruiz RN  Outcome: Ongoing  Note: D/c planning is in progress     Problem: Altered Mood, Deterioration in Function:  Goal: Ability to perform activities of daily living will improve  Description: Ability to perform activities of daily living will improve  12/21/2021 1042 by Inessa Catherine RN  Outcome: Ongoing  Note: Pt continues to struggle performing ADLs  12/20/2021 2119 by Nuha Ruiz RN  Outcome: Met This Shift  Note: Pt was able to take a shower and brush her teeth on previous shift, washed and dried clothes this evening, needs encouragement  Goal: Able to verbalize reality based thinking  Description: Able to verbalize reality based thinking  12/21/2021 1042 by Inessa Catherine RN  Outcome: Ongoing  Note: Pt unable to verbalize reality based thinking  12/20/2021 2119 by Nuha Ruiz RN  Outcome: Ongoing  Note: Pt is paranoid  Goal: Participates in care planning  Description: Participates in care planning  12/21/2021 1042 by Inessa Catherine RN  Outcome: Ongoing  Note: This patient participates in care planning   12/20/2021 2119 by Nuha Ruiz RN  Outcome: Ongoing  Note: Care plan reviewed with patient.   Patient does verbalize understanding of the plan of care and does contribute to goal setting   Goal: Patient specific goal  Description: Patient specific goal  12/21/2021 1042 by Inessa Catherine RN  Outcome: Met This Shift  Note: To come to groups today - met  12/20/2021 2119 by Nuha Ruiz RN  Outcome: Met This Shift  Note: Met goal of taking a shower and getting out of her room more     Problem: Altered Mood, Psychotic Behavior:  Goal: Able to verbalize reality based thinking  Description: Able to verbalize reality based thinking  12/21/2021 1042 by Antonina Aguilar RN  Outcome: Ongoing  Note: Pt unable to verbalize reality based thinking  12/20/2021 2119 by Marita Armas RN  Outcome: Ongoing  Note: Pt is paranoid     Problem: Coping:  Goal: Ability to identify problematic behaviors that deter socialization will improve  Description: Ability to identify problematic behaviors that deter socialization will improve  12/21/2021 1042 by Antonina Aguilar RN  Outcome: Ongoing  12/20/2021 2119 by Marita Armas RN  Outcome: Ongoing  Note: Pt was out on unit with encouragement, isolates to self, no peer interaction noted      Problem: Role Relationship:  Goal: Ability to demonstrate positive changes in social behaviors and relationships will improve  Description: Ability to demonstrate positive changes in social behaviors and relationships will improve  12/21/2021 1042 by Antonina Aguilar RN  Outcome: Ongoing  12/20/2021 2119 by Marita Armas RN  Outcome: Ongoing     Problem: Anxiety:  Goal: Level of anxiety will decrease  Description: Level of anxiety will decrease  12/21/2021 1042 by Antonina Aguilar RN  Outcome: Ongoing  Note: Patient reports  anxiety. Pt taking scheduled Klonopin. Verbalized medication was somewhat effective. 12/20/2021 2119 by Marita Armas RN  Outcome: Ongoing  Note: Pt had scheduled klonopin for anxiety, tearful at times, repeats I am so scared     Problem:  Bowel/Gastric:  Goal: Occurrences of constipation will decrease  Description: Occurrences of constipation will decrease  12/21/2021 1042 by Antonina Aguilar RN  Outcome: Ongoing  Note: Pt having constipation, medication given per order  12/20/2021 2129 by Marita Armas RN  Outcome: Met This Shift  Note: Pt reports had a BM earlier today, denies need for prn medication     Problem: OXYGENATION/RESPIRATORY FUNCTION  Goal: Able to breathe comfortably  12/21/2021 1042 by Antonina Aguilar RN  Outcome: Met This Shift  Note: No SOB reported, will continue to monitor  12/20/2021 2119 by Omid Bowles RN  Outcome: Ongoing  Note: Pulse ox 97 % on room air, O2 at 2L n/c at bedtime as ordered, staff 1:1 with pt with O2     Problem: OTHER  Goal: Other  Description: No c/o indigestion  12/20/2021 2130 by Omid Bowles RN  Outcome: Completed  Note: Pt denied having indigestion this evening   Care plan reviewed with patient.   Patient does not verbalize understanding of the plan of care and does contribute to goal setting

## 2021-12-21 NOTE — PLAN OF CARE
Patient has attended 2 groups so far today and has also been out of her room to socialize with others this shift so she has met her socialization goal.

## 2021-12-21 NOTE — FLOWSHEET NOTE
12/21/21 1530   Encounter Summary   Services provided to: Patient   Continue Visiting Yes  (12/21 Spiritual Care group)   Complexity of Encounter Moderate   Length of Encounter 45 minutes   Spiritual/Moravian   Type Spiritual support   Spiritual Support Group Note    Number of Participants in Group: 6                                Time: 15:30    Goal: Relief from isolation and loneliness             Marjorie Sharing             Self-understanding and gain insight              Acceptance and belonging            Recognize they are not alone                Socialization             Empowerment       Encouragement    Topic:  [x] Spiritual Wellness and Self Care                  [x] Hope                     [x] Connecting with Divine/Others        [x] Thankfulness and Gratitude               [x]  Meaningfulness and Purpose               [x] Forgiveness               [x] Peace               [x] Connect to [employer]     [x] Other:    Participation Level:   [x] Active Listener   [] Minimal   [] Monopolizing   [] Interactive   [] No Participation   []  Other:     Attention:   [x] Alert   [] Distractible   [] Drowsy   [] Poor   [] Other:    Manner:   [x] Cooperative   [] Suspicious   [] Withdrawn   [] Guarded   [] Irritable   [] Inhospitable   [] Other:     Others Comments from Group:   During the Spiritual Support group, the patient actively participated as they discussed their plans, purpose and positive choices in life.

## 2021-12-22 VITALS
HEART RATE: 81 BPM | SYSTOLIC BLOOD PRESSURE: 141 MMHG | OXYGEN SATURATION: 97 % | WEIGHT: 130 LBS | RESPIRATION RATE: 18 BRPM | DIASTOLIC BLOOD PRESSURE: 68 MMHG | BODY MASS INDEX: 18.61 KG/M2 | TEMPERATURE: 97.8 F | HEIGHT: 70 IN

## 2021-12-22 PROCEDURE — 99239 HOSP IP/OBS DSCHRG MGMT >30: CPT | Performed by: PSYCHIATRY & NEUROLOGY

## 2021-12-22 PROCEDURE — 5130000000 HC BRIDGE APPOINTMENT

## 2021-12-22 PROCEDURE — 6370000000 HC RX 637 (ALT 250 FOR IP)

## 2021-12-22 PROCEDURE — 6370000000 HC RX 637 (ALT 250 FOR IP): Performed by: PSYCHIATRY & NEUROLOGY

## 2021-12-22 RX ORDER — ALBUTEROL SULFATE 90 UG/1
2 AEROSOL, METERED RESPIRATORY (INHALATION) EVERY 4 HOURS PRN
Qty: 18 G | Refills: 0 | Status: SHIPPED | OUTPATIENT
Start: 2021-12-22

## 2021-12-22 RX ORDER — BUSPIRONE HYDROCHLORIDE 10 MG/1
10 TABLET ORAL 2 TIMES DAILY
Qty: 90 TABLET | Refills: 0 | Status: SHIPPED | OUTPATIENT
Start: 2021-12-22 | End: 2022-01-21

## 2021-12-22 RX ORDER — CLONAZEPAM 1 MG/1
1 TABLET ORAL 2 TIMES DAILY
Qty: 30 TABLET | Refills: 0 | Status: SHIPPED | OUTPATIENT
Start: 2021-12-22 | End: 2022-01-06

## 2021-12-22 RX ORDER — OLANZAPINE 5 MG/1
5 TABLET ORAL 2 TIMES DAILY
Qty: 60 TABLET | Refills: 0 | Status: SHIPPED | OUTPATIENT
Start: 2021-12-22

## 2021-12-22 RX ORDER — TRAZODONE HYDROCHLORIDE 50 MG/1
50 TABLET ORAL NIGHTLY PRN
Qty: 30 TABLET | Refills: 0 | Status: SHIPPED | OUTPATIENT
Start: 2021-12-22

## 2021-12-22 RX ORDER — ATORVASTATIN CALCIUM 40 MG/1
40 TABLET, FILM COATED ORAL DAILY
Qty: 30 TABLET | Refills: 0 | Status: SHIPPED | OUTPATIENT
Start: 2021-12-22

## 2021-12-22 RX ADMIN — BUSPIRONE HYDROCHLORIDE 10 MG: 10 TABLET ORAL at 08:38

## 2021-12-22 RX ADMIN — OLANZAPINE 5 MG: 5 TABLET, FILM COATED ORAL at 08:38

## 2021-12-22 RX ADMIN — CLONAZEPAM 1 MG: 1 TABLET ORAL at 08:38

## 2021-12-22 RX ADMIN — PANTOPRAZOLE SODIUM 40 MG: 40 TABLET, DELAYED RELEASE ORAL at 08:38

## 2021-12-22 ASSESSMENT — PAIN SCALES - GENERAL: PAINLEVEL_OUTOF10: 0

## 2021-12-22 NOTE — DISCHARGE SUMMARY
Provider Discharge Summary     Patient ID:  Caden Maddox  [id number]  65 y.o.  1965    Admit date: 12/16/2021    Discharge date and time: 12/22/2021  5:29 PM     Admitting Physician: Taras Brothers MD     Discharge Physician: Taras Brothers MD    Admission Diagnoses: Acute psychosis [facility]) [F23]    Discharge Diagnoses:      Acute psychosis [facility])     Patient Active Problem List   Diagnosis Code    Altered mental status R41.82    COPD (chronic obstructive pulmonary disease) ([facility] [address] J44.9    Acute psychosis ([facility] [address] F23    Severe malnutrition ([facility] [address] E43        Admission Condition: poor    Discharged Condition: stable    Indication for Admission: threat to self    History of Present Illnes (present tense wording is of findings from admission exam and are not necessarily indicative of current findings):   Caden Maddox is a 49 y.o. female with a history of cannabis use, IVDU, anxiety and depression who was admitted directly from the medical floor for psychosis and inability to care for self.      Patient was seen and evaluated by psychiatric consult services on 12/15/2021. Per the consult note:   \"Per chart review, patient presented to the ED with her family and was referred to the ED by her PCP for further evaluation of ongoing issues over the past four months. Per the patient's family, the past several months patient has had a decline in her mental status and ability to function in daily life.  Family reports this started out after her neighbor's house had cockroaches. She put Borax around her house. Ana Whitaker moved away then the next neighbors had mice.  She then put Decon around her house.  Ms. Sandee Peralta started to have white substance in her mouth and throat and was concerned that she was poisoned by the substances she placed around her house.  The patient had chest pain and palpitations.  Family reported that this just was the start of the decline in her mental status.  The patient went to multiple ERs and her PCP for a multitude of medical complaints. Patient had a head CT at [facility] on 8373 that was normal and a visit here on 124 and was discharged.  2.5 weeks ago she was seen in [facility] and was admitted to a [place] coping center where it was considered putting the patient on dementia medicines.   Patient felt her psych medications were not working so her Celexa was changed to Seroquel.  She continued her [address] the symptoms worsened so she stopped both those medications.  Patient has been confused, cannot make decisions, and feels she cannot eat.  Ms. Khan has been afraid to sleep for fear that she is going to die. Vicki Santana is scared to be by herself. Usha Hernandez reported that she was never like this before. Vicki Santana is normally an upbeat person who cared for herself and lived on her own. Otis Vicente is now living with her sister and her sister can no longer take care of her.  The patient cannot function on her own. Elyse Robertson family took the patient to her PCP who sent her to the ER for admission and psychiatric and medical evaluations. Ethanadalgisa Wan admission, when asked about physical symptoms, patient had difficulty answering questions. Otis Vicente is not suicidal or homicidal.  She denies nausea or vomiting.  Patient smokes cigarettes and marijuana but denies alcohol or other illicit drug use. Otis Vicente is vaccinated against Covid.    Patient later shared a history of IVDU and high risk sexual behavior 30 years ago. Vicki Santana was never treated for STDs but admits having many unprotected partners \"when I was a crackhead\".  She was  4 times and is now .  Otis Vicente also complained of a \"thick white coating\" inside of her mouth that has been coming and going over the past 4 months.     Psychiatry was consulted due to altered mental status and the patient's inability to care for self     Matt Jiménez has been experiencing an overwhelming sense of paranoia and disconnect lately. Vicki Santana says she has been feeling very forgetful and confused at times. She mentions her episodes of confusion are usually worse in the morning after she wakes up and diminish at night. She says this all started around August when she had mice in the home and messed with Decon at that time.  Shortly after that, she developed a white film on her tongue, experienced dry mouth, increased mucus production, heart palpitations,dizziness and had red glassy eyes.  She has also been experiencing abdominal pain, constipation and increased flatulence since then. She reports she has not been able to take care of herself lately. Gabby Donovan has not been able to make simple decisions such as what she wants to eat.  She reports she always feels like she doesn't know. She says she was recently in the [facility] for 3 days and was not able to order her breakfast.  She says during that time, she felt like a \"looney tune\" and was hysterically crying but not producing tears. She states she is currently living with her sister because of this but her sister has not been able to care for either. Matti Grider to August, she was living on her own and was adequately taking care of herself. Gabby Donovan says she will be with other people but still feels very disconnected.  She also has been feeling like the TV is overstimulating at times. Gabby Donovan says she feels like she is impaired or on drugs.  She also has been experiencing a lot of shakiness and restlessness.  She says she has been experiencing real bad panic and fear. Gabby Donovan has not been driving because she is afraid that she will appear impaired to law enforcement.  She states she has been feeling paranoid that her phone is going to be hacked and her personal information will be leaked. Gabby Donovan talks about how she has been poor all of her life but received a inheritance from her aunt upon her passing. Gabby Donovan says she has also been paranoid that her family members are trying to kill her in order to get the money.    She reports her appetite has been poor. She has not been sleeping well but reports she has not been utilizing her nightly oxygen at home.     Patient is restless and animated during the examination. She has rapid and pressured speech. She is able to hold a relevant conversation with this writer but does exhibit tangentiality and circumstantiality at times. She is fully oriented to person place situation and time during the interview. She recognizes that she has been experiencing paranoid but knows that they are delusions. She continue to endorse severe anxiety and reports Ativan has been helpful for her. She denies any suicidal or homicidal ideation. Denies any hallucinations. \"     Today:  Alicia Cunningham is seen in her room shortly after waking up. She reports her confusion is really bad today. She says she does not understand what is going on she was okay on Wednesday because but woke up yesterday and today confused. She said when she was at home, she would be confused every day and was not able to function. When asked how she was not able to function, she said she was unable to express herself. She reports she was so nervous and scared and felt in a fog. She says she could see everyone else being normal but still could not comprehend things. Patient reports she feels very sick today and had a fever last night. Per staff documentation, her temperature was 97.8 last night. During the interview, the patient appears distressed and has a hard time answering questions. She does not answer if she is currently feeling anxious or paranoid at this time. However, she appears overtly anxious. She repeatedly says that she cannot think, is confused and could not function at home. She appears to be fully oriented as she is able to recall the events that led to her admission and the conversation this writer had with her on Wednesday when she was seen by consult services on the medical floor.    On arrival to the unit yesterday, she was sobbing, very anxious and unable to complete the admission process. Staff informed this writer and Klonopin 0.5 mg twice daily was added at that time. Since admission, the patient has been completing her ADLs with no issues. Staff reported she has been going back and forth between being oriented and disoriented to situation. Staff did report last night she came up to the nurses station with a couple of [address] and asked staff why her water looked funny  Hospital Course:   Upon admission, Saige Johnson was provided a safe secure environment, introduced to unit milieu. Patient participated in groups and individual therapies. Meds were adjusted as noted below. After few days of hospital care, patient began to feel improvement. Depression lifted, thoughts to harm self ceased. Sleep improved, appetite was good. On morning rounds 12/22/2021, Saige Johnson endorses feeling ready for discharge. Patient denies suicidal or homicidal ideations, denies hallucinations or delusions. Denies SE's from meds. It was decided that maximum benefit from hospital care had been achieved and patient can be discharged. Consults:   none    Significant Diagnostic Studies: Routine labs and diagnostics    Treatments: Psychotropic medications, therapy with group, milieu, and 1:1 with nurses, social workers, PAATIYA/CNP, and Attending physician. Discharge Medications:  Discharge Medication List as of 12/22/2021  1:55 PM      START taking these medications    Details   clonazePAM (KLONOPIN) 1 MG tablet Take 1 tablet by mouth 2 times daily for 15 days. , Disp-30 tablet, R-0Normal      traZODone (DESYREL) 50 MG tablet Take 1 tablet by mouth nightly as needed for Sleep, Disp-30 tablet, R-0Normal      OLANZapine (ZYPREXA) 5 MG tablet Take 1 tablet by mouth 2 times daily, Disp-60 tablet, R-0Normal         CONTINUE these medications which have CHANGED    Details   busPIRone (BUSPAR) 10 MG tablet Take 1 tablet by mouth 2 times daily Indications: Feeling activity as tolerated  1. Patient instructed to take medications regularly and follow up with outpatient appointments. Follow-up as scheduled with Anne Marie Stover       Signed:    Electronically signed by Carissa Vu MD on 12/22/21 at 5:29 PM EST    Time Spent on discharge is more than 35 minutes in the examination, evaluation, counseling and review of medications and discharge plan.

## 2021-12-22 NOTE — PLAN OF CARE
[facility]  OCCUPATIONAL THERAPY MISSED TREATMENT NOTE  [facility] ADULT PSY[facility] 4E  [id number]      Date: 2021  Patient Name: Juan Pablo Chavez        CSN: [id number]   : 1965  (49 y.o.)  Gender: female   Referring Practitioner: Julia Del Rio PA-C  Diagnosis: Acute Psychosis         REASON FOR MISSED TREATMENT: Patient Unavailable  Pt was being discharged home with help from family this day. Discontinue OT order at this time.

## 2021-12-22 NOTE — PROGRESS NOTES
[facility]   Discharge Note    Pt discharged with followings belongings:   Dental Appliances: None  Vision - Corrective Lenses: None  Hearing Aid: None  Jewelry: None  Body Piercings Removed: N/A  Clothing: Hildred Carne / coat,Pants,Shirt,Undergarments (Comment)  Were All Patient Medications Collected?: Yes  Other Valuables: Cell phone,Money (Comment),Purse,Wallet   Valuables sent home with patient. Valuables retrieved from safe, Security envelope number:  N/A and returned to patient. Patient left department with staff via ambulatory. Discharged to private residence. \"An Important Message from [employer] About Your Rights\" form photocopy original from admission and provided to pt at discharge YES. Patient education on aftercare instructions: YES  Reviewed Discharge Instructions with patient/family/nursing facility. Patient/family verbalizes understanding and agreement with the discharge plan using the teachback method. Patient/family verbalize understanding of AVS:  YES. Status EXAM upon discharge:  Status and Exam  Normal: No  Facial Expression: Sad,Worried  Affect: Blunt  Level of Consciousness: Alert  Mood:Normal: No  Mood: Depressed,Anxious,Helpless  Motor Activity:Normal: Yes  Motor Activity: Decreased  Interview Behavior: Cooperative  Preception: [place] to Person,[place] to Time,[place] to Place,[place] to Situation  Attention:Normal: No  Attention: Distractible  Thought Processes: Circumstantial  Thought Content:Normal: No  Thought Content: Preoccupations  Hallucinations: None  Delusions: No  Delusions:  Other(See Comment)  Memory:Normal: No  Memory: Poor Recent,Poor Remote  Insight and Judgment: No  Insight and Judgment: Poor Judgment,Poor Insight  Present Suicidal Ideation: No  Present Homicidal Ideation: No    Marta Gillis RN

## 2021-12-22 NOTE — PLAN OF CARE
Problem: Altered Mood, Deterioration in Function:  Goal: Participates in care planning  Description: Participates in care planning  12/21/2021 2217 by Myra Rand RN  Outcome: Met This Shift  Note: Care plan reviewed with patient. Patient does verbalize understanding of the plan of care and does contribute to goal setting   12/21/2021 1042 by Phill Bobby RN  Outcome: Ongoing  Note: This patient participates in care planning   Goal: Patient specific goal  Description: Patient specific goal  12/21/2021 2217 by Myra Rand RN  Outcome: Met This Shift  Note: Met goal of going to groups but reports she did not participate  12/21/2021 1042 by Phill Bobby RN  Outcome: Met This Shift  Note: To come to groups today - met     Problem: Discharge Planning:  Goal: Discharged to appropriate level of care  Description: Discharged to appropriate level of care  12/21/2021 2217 by Myra Rand RN  Outcome: Ongoing  Note: D/c planning is in progress  12/21/2021 1042 by Phill Bobby RN  Outcome: Not Met This Shift  Note: Discharge planners working with patient to achieve optimal discharge plan, specific to the needs of this patient.       Problem: Altered Mood, Deterioration in Function:  Goal: Able to verbalize reality based thinking  Description: Able to verbalize reality based thinking  12/21/2021 2217 by Myra Rand RN  Outcome: Ongoing  Note: No delusions voiced  12/21/2021 1042 by Phill Bobby RN  Outcome: Ongoing  Note: Pt unable to verbalize reality based thinking     Problem: Altered Mood, Psychotic Behavior:  Goal: Able to verbalize reality based thinking  Description: Able to verbalize reality based thinking  12/21/2021 2217 by Myra Rand RN  Outcome: Ongoing  Note: No delusions voiced  12/21/2021 1042 by Phill Bobby RN  Outcome: Ongoing  Note: Pt unable to verbalize reality based thinking     Problem: OXYGENATION/RESPIRATORY FUNCTION  Goal: Able to breathe comfortably  12/21/2021 2217 by Pam Razo RN  Outcome: Ongoing  Note: Pulse ox 96% on room air, O2 at 2L per n/c on at night, staff 1:1 with pt while has O2  12/21/2021 1042 by Minna Garcia RN  Outcome: Met This Shift  Note: No SOB reported, will continue to monitor     Problem: Coping:  Goal: Ability to identify problematic behaviors that deter socialization will improve  Description: Ability to identify problematic behaviors that deter socialization will improve  12/21/2021 2217 by Pam Razo RN  Outcome: Ongoing  Note: Pt out on the unit with encouragement, no peer interaction noted  12/21/2021 1401 by Dara Novak  Outcome: Ongoing  12/21/2021 1042 by Minna Garcia RN  Outcome: Ongoing     Problem: Anxiety:  Goal: Level of anxiety will decrease  Description: Level of anxiety will decrease  12/21/2021 2217 by Pam Razo RN  Outcome: Ongoing  Note: Pt very anxious, hopeless and helpless, scheduled klonopin given, very worried about going to nursing home and how she can afford it and is worried she will be homeless when she gets out of the nursing home  12/21/2021 1042 by Minna Garcia RN  Outcome: Ongoing  Note: Patient reports  anxiety. Pt taking scheduled Klonopin. Verbalized medication was somewhat effective. Problem: Role Relationship:  Goal: Ability to demonstrate positive changes in social behaviors and relationships will improve  Description: Ability to demonstrate positive changes in social behaviors and relationships will improve  12/21/2021 2217 by Pam Razo RN  Outcome: Ongoing  12/21/2021 1401 by Dara Novak  Outcome: Ongoing  12/21/2021 1042 by Minna Garcia RN  Outcome: Ongoing     Problem:  Bowel/Gastric:  Goal: Occurrences of constipation will decrease  Description: Occurrences of constipation will decrease  12/21/2021 2217 by Pam Razo RN  Outcome: Ongoing  Note: Reports she had a BM today  12/21/2021 1042 by Minna Garcia RN  Outcome: Ongoing  Note: Pt having constipation, medication given per order     Problem: OTHER  Goal: Other  Description: No c/o indigestion  12/21/2021 2217 by Cory Dee RN  Outcome: Ongoing  Note: Pt had maalox for c/o indigestion and gas  12/21/2021 2217 by Cory Dee RN  Reactivated     Problem: Altered Mood, Deterioration in Function:  Goal: Ability to perform activities of daily living will improve  Description: Ability to perform activities of daily living will improve  12/21/2021 2217 by Cory Dee RN  Outcome: Completed  Note: Pt showered and brushed teeth this evening with no prompting from staff  12/21/2021 1042 by Elida Rousseau RN  Outcome: Ongoing  Note: Pt continues to struggle performing ADLs

## 2021-12-22 NOTE — BH NOTE
Pt attended goal wrap up and relaxation group, little participation, pt did meet her goal of going to groups but reports she did not participate.

## 2021-12-22 NOTE — DISCHARGE INSTR - DIET
Good nutrition is important when healing from an illness, injury, or surgery. Follow any nutrition recommendations given to you during your hospital stay. If you were given an oral nutrition supplement while in the hospital, continue to take this supplement at home. You can take it with meals, in-between meals, and/or before bedtime. These supplements can be purchased at most local grocery stores, pharmacies, and chain [employer]stores. If you have any questions about your diet or nutrition, call the hospital and ask for the dietitian.       ENSURE FOR ADDED NUTRITION

## 2021-12-22 NOTE — PROGRESS NOTES
Phone call placed to home health who will be processing patient information and will contact patient upon discharge.

## 2021-12-23 ENCOUNTER — TELEPHONE (OUTPATIENT)
Dept: PSYCHIATRY | Age: 56
End: 2021-12-23